# Patient Record
Sex: MALE | Race: BLACK OR AFRICAN AMERICAN | Employment: FULL TIME | URBAN - METROPOLITAN AREA
[De-identification: names, ages, dates, MRNs, and addresses within clinical notes are randomized per-mention and may not be internally consistent; named-entity substitution may affect disease eponyms.]

---

## 2018-11-25 ENCOUNTER — APPOINTMENT (OUTPATIENT)
Dept: ULTRASOUND IMAGING | Age: 53
End: 2018-11-25
Payer: SELF-PAY

## 2018-11-25 ENCOUNTER — HOSPITAL ENCOUNTER (EMERGENCY)
Age: 53
Discharge: HOME | End: 2018-11-25
Attending: EMERGENCY MEDICINE
Payer: SELF-PAY

## 2018-11-25 VITALS
HEART RATE: 72 BPM | SYSTOLIC BLOOD PRESSURE: 199 MMHG | RESPIRATION RATE: 16 BRPM | BODY MASS INDEX: 44.1 KG/M2 | HEIGHT: 71 IN | WEIGHT: 315 LBS | TEMPERATURE: 98.2 F | DIASTOLIC BLOOD PRESSURE: 126 MMHG | OXYGEN SATURATION: 93 %

## 2018-11-25 DIAGNOSIS — N45.3 EPIDIDYMOORCHITIS: Primary | ICD-10-CM

## 2018-11-25 DIAGNOSIS — I10 UNCONTROLLED HYPERTENSION: ICD-10-CM

## 2018-11-25 LAB
APPEARANCE UR: CLEAR
BACTERIA URNS QL MICRO: ABNORMAL /HPF
BILIRUB UR QL CFM: NEGATIVE
COLOR UR: ABNORMAL
EPITH CASTS URNS QL MICRO: ABNORMAL /LPF
GLUCOSE UR STRIP.AUTO-MCNC: NEGATIVE MG/DL
HGB UR QL STRIP: NEGATIVE
KETONES UR QL STRIP.AUTO: NEGATIVE MG/DL
LEUKOCYTE ESTERASE UR QL STRIP.AUTO: NEGATIVE
NITRITE UR QL STRIP.AUTO: NEGATIVE
OTHER,OTHU: ABNORMAL
PH UR STRIP: 5.5 [PH] (ref 5–8)
PROT UR STRIP-MCNC: 30 MG/DL
RBC #/AREA URNS HPF: ABNORMAL /HPF (ref 0–5)
SP GR UR REFRACTOMETRY: 1.03 (ref 1–1.03)
UA: UC IF INDICATED,UAUC: ABNORMAL
UROBILINOGEN UR QL STRIP.AUTO: 1 EU/DL (ref 0.2–1)
WBC URNS QL MICRO: ABNORMAL /HPF (ref 0–4)

## 2018-11-25 PROCEDURE — 76870 US EXAM SCROTUM: CPT

## 2018-11-25 PROCEDURE — 99283 EMERGENCY DEPT VISIT LOW MDM: CPT

## 2018-11-25 PROCEDURE — 96372 THER/PROPH/DIAG INJ SC/IM: CPT

## 2018-11-25 PROCEDURE — 74011250637 HC RX REV CODE- 250/637: Performed by: PHYSICIAN ASSISTANT

## 2018-11-25 PROCEDURE — 87086 URINE CULTURE/COLONY COUNT: CPT

## 2018-11-25 PROCEDURE — 81001 URINALYSIS AUTO W/SCOPE: CPT

## 2018-11-25 PROCEDURE — 74011250636 HC RX REV CODE- 250/636: Performed by: PHYSICIAN ASSISTANT

## 2018-11-25 RX ORDER — HYDROCHLOROTHIAZIDE 25 MG/1
25 TABLET ORAL
Status: COMPLETED | OUTPATIENT
Start: 2018-11-25 | End: 2018-11-25

## 2018-11-25 RX ORDER — CIPROFLOXACIN 500 MG/1
500 TABLET ORAL 2 TIMES DAILY
Qty: 56 TAB | Refills: 0 | Status: SHIPPED | OUTPATIENT
Start: 2018-11-25 | End: 2018-12-23

## 2018-11-25 RX ORDER — CIPROFLOXACIN 500 MG/1
500 TABLET ORAL 2 TIMES DAILY
Qty: 56 TAB | Refills: 0 | Status: SHIPPED | OUTPATIENT
Start: 2018-11-25 | End: 2018-11-25

## 2018-11-25 RX ORDER — OXYCODONE AND ACETAMINOPHEN 5; 325 MG/1; MG/1
1 TABLET ORAL
Status: COMPLETED | OUTPATIENT
Start: 2018-11-25 | End: 2018-11-25

## 2018-11-25 RX ORDER — ATENOLOL 25 MG/1
25 TABLET ORAL
Qty: 30 TAB | Refills: 0 | Status: SHIPPED | OUTPATIENT
Start: 2018-11-25 | End: 2018-12-25

## 2018-11-25 RX ORDER — OXYCODONE AND ACETAMINOPHEN 5; 325 MG/1; MG/1
1 TABLET ORAL
Qty: 12 TAB | Refills: 0 | Status: SHIPPED | OUTPATIENT
Start: 2018-11-25 | End: 2019-12-03

## 2018-11-25 RX ORDER — KETOROLAC TROMETHAMINE 30 MG/ML
60 INJECTION, SOLUTION INTRAMUSCULAR; INTRAVENOUS
Status: COMPLETED | OUTPATIENT
Start: 2018-11-25 | End: 2018-11-25

## 2018-11-25 RX ORDER — ONDANSETRON 4 MG/1
4 TABLET, ORALLY DISINTEGRATING ORAL
Status: COMPLETED | OUTPATIENT
Start: 2018-11-25 | End: 2018-11-25

## 2018-11-25 RX ORDER — METOPROLOL SUCCINATE AND HYDROCHLOROTHIAZIDE 25; 12.5 MG/1; MG/1
25 TABLET ORAL
Qty: 30 TAB | Refills: 0 | Status: SHIPPED | OUTPATIENT
Start: 2018-11-25 | End: 2018-11-25 | Stop reason: CLARIF

## 2018-11-25 RX ORDER — NAPROXEN 500 MG/1
500 TABLET ORAL
Qty: 20 TAB | Refills: 0 | Status: SHIPPED | OUTPATIENT
Start: 2018-11-25 | End: 2019-12-03

## 2018-11-25 RX ORDER — LISINOPRIL AND HYDROCHLOROTHIAZIDE 12.5; 2 MG/1; MG/1
2 TABLET ORAL DAILY
Qty: 60 TAB | Refills: 0 | Status: SHIPPED | OUTPATIENT
Start: 2018-11-25 | End: 2018-11-25

## 2018-11-25 RX ORDER — ATENOLOL 25 MG/1
25 TABLET ORAL
Qty: 30 TAB | Refills: 0 | Status: SHIPPED | OUTPATIENT
Start: 2018-11-25 | End: 2018-11-25

## 2018-11-25 RX ORDER — LISINOPRIL AND HYDROCHLOROTHIAZIDE 12.5; 2 MG/1; MG/1
2 TABLET ORAL DAILY
Qty: 60 TAB | Refills: 0 | Status: SHIPPED | OUTPATIENT
Start: 2018-11-25 | End: 2018-12-25

## 2018-11-25 RX ORDER — LISINOPRIL 20 MG/1
20 TABLET ORAL
Status: COMPLETED | OUTPATIENT
Start: 2018-11-25 | End: 2018-11-25

## 2018-11-25 RX ORDER — NAPROXEN 500 MG/1
500 TABLET ORAL
Qty: 20 TAB | Refills: 0 | Status: SHIPPED | OUTPATIENT
Start: 2018-11-25 | End: 2018-11-25

## 2018-11-25 RX ADMIN — ONDANSETRON 4 MG: 4 TABLET, ORALLY DISINTEGRATING ORAL at 12:30

## 2018-11-25 RX ADMIN — OXYCODONE AND ACETAMINOPHEN 1 TABLET: 5; 325 TABLET ORAL at 12:31

## 2018-11-25 RX ADMIN — LISINOPRIL 20 MG: 20 TABLET ORAL at 13:33

## 2018-11-25 RX ADMIN — HYDROCHLOROTHIAZIDE 25 MG: 25 TABLET ORAL at 13:33

## 2018-11-25 RX ADMIN — KETOROLAC TROMETHAMINE 60 MG: 30 INJECTION, SOLUTION INTRAMUSCULAR at 12:30

## 2018-11-25 NOTE — ED NOTES
I have assumed care of the patient. The patient has been placed in a position of comfort with the call bell within reach. The patient presents to the ED with complaints of right sided testicular pain and swelling for the past several days. Denies any injury.

## 2018-11-25 NOTE — DISCHARGE INSTRUCTIONS
Learning About High Blood Pressure  What is high blood pressure? Blood pressure is a measure of how hard the blood pushes against the walls of your arteries. It's normal for blood pressure to go up and down throughout the day, but if it stays up, you have high blood pressure. Another name for high blood pressure is hypertension. Two numbers tell you your blood pressure. The first number is the systolic pressure. It shows how hard the blood pushes when your heart is pumping. The second number is the diastolic pressure. It shows how hard the blood pushes between heartbeats, when your heart is relaxed and filling with blood. A blood pressure of less than 120/80 (say \"120 over 80\") is ideal for an adult. High blood pressure is 130/80 or higher. You have high blood pressure if your top number is 130 or higher or your bottom number is 80 or higher, or both. What happens when you have high blood pressure? · Blood flows through your arteries with too much force. Over time, this damages the walls of your arteries. But you can't feel it. High blood pressure usually doesn't cause symptoms. · Fat and calcium start to build up in your arteries. This buildup is called plaque. Plaque makes your arteries narrower and stiffer. Blood can't flow through them as easily. · This lack of good blood flow starts to damage some of the organs in your body. This can lead to problems such as coronary artery disease and heart attack, heart failure, stroke, kidney failure, and eye damage. How can you prevent high blood pressure? · Stay at a healthy weight. · Try to limit how much sodium you eat to less than 2,300 milligrams (mg) a day. If you limit your sodium to 1,500 mg a day, you can lower your blood pressure even more. ? Buy foods that are labeled \"unsalted,\" \"sodium-free,\" or \"low-sodium. \" Foods labeled \"reduced-sodium\" and \"light sodium\" may still have too much sodium. ?  Flavor your food with garlic, lemon juice, onion, vinegar, herbs, and spices instead of salt. Do not use soy sauce, steak sauce, onion salt, garlic salt, mustard, or ketchup on your food. ? Use less salt (or none) when recipes call for it. You can often use half the salt a recipe calls for without losing flavor. · Be physically active. Get at least 30 minutes of exercise on most days of the week. Walking is a good choice. You also may want to do other activities, such as running, swimming, cycling, or playing tennis or team sports. · Limit alcohol to 2 drinks a day for men and 1 drink a day for women. · Eat plenty of fruits, vegetables, and low-fat dairy products. Eat less saturated and total fats. How is high blood pressure treated? · Your doctor will suggest making lifestyle changes. For example, your doctor may ask you to eat healthy foods, quit smoking, lose extra weight, and be more active. · If lifestyle changes don't help enough, your doctor may recommend that you take medicine. · When blood pressure is very high, medicines are needed to lower it. Follow-up care is a key part of your treatment and safety. Be sure to make and go to all appointments, and call your doctor if you are having problems. It's also a good idea to know your test results and keep a list of the medicines you take. Where can you learn more? Go to http://kiera-josé luis.info/. Enter P501 in the search box to learn more about \"Learning About High Blood Pressure. \"  Current as of: December 6, 2017  Content Version: 11.8  © 5824-9607 [employer]. Care instructions adapted under license by [employer] (which disclaims liability or warranty for this information). If you have questions about a medical condition or this instruction, always ask your healthcare professional. [address] any warranty or liability for your use of this information.          Epididymitis and Orchitis: Care Instructions  Your Care Instructions    Epididymitis is pain and swelling of the tube that is attached to each testicle. This tube is called the epididymis. Orchitis is pain and swelling of the testicle. Infection with bacteria often causes these conditions. Sexually transmitted infections (STIs) also can cause both conditions. This is often the case in men younger than 28. Other causes in boys and older men are infections from surgery or having a catheter that drains urine. The mumps virus also can cause orchitis. Anti-inflammatory or pain medicines can help with the pain. Antibiotics are used if the problem is caused by bacteria. They are not used if a virus is the cause. Your testicle may stay swollen for many days or even a few weeks. The doctor has checked you carefully, but problems can develop later. If you notice any problems or new symptoms, get medical treatment right away. Follow-up care is a key part of your treatment and safety. Be sure to make and go to all appointments, and call your doctor if you are having problems. It's also a good idea to know your test results and keep a list of the medicines you take. How can you care for yourself at home? · If your doctor prescribed antibiotics, take them as directed. Do not stop taking them just because you feel better. You need to take the full course of antibiotics. · Ask your doctor if you can take an over-the-counter pain medicine, such as acetaminophen (Tylenol), ibuprofen (Advil, Motrin), or naproxen (Aleve). Be safe with medicines. Read and follow all instructions on the label. · Limit your activity to what is comfortable. · Wear snug underwear or an athletic supporter. This can help reduce pain. · Apply either cold or heat to the swollen area. Use the one that works best for your pain. Sitting in a warm bath for 15 minutes twice a day will help reduce the swelling more quickly.   · If you have been told that an STI may have caused your condition, do not have sex until your doctor says it is safe. This will prevent spreading the infection. Tell your sex partner or partners that they need to be checked. They may need treatment. When should you call for help? Call your doctor now or seek immediate medical care if:    · Your pain gets worse.     · You have a new or higher fever.     · You have new or more swelling of your testicle.     · You have new belly pain, or your pain gets worse.    Watch closely for changes in your health, and be sure to contact your doctor if:    · You do not get better as expected. Where can you learn more? Go to http://kiera-josé luis.info/. Enter S360 in the search box to learn more about \"Epididymitis and Orchitis: Care Instructions. \"  Current as of: March 21, 2018  Content Version: 11.8  © 0896-8997 [employer]. Care instructions adapted under license by [employer] (which disclaims liability or warranty for this information). If you have questions about a medical condition or this instruction, always ask your healthcare professional. [address] any warranty or liability for your use of this information.

## 2018-11-25 NOTE — ED PROVIDER NOTES
EMERGENCY DEPARTMENT HISTORY AND PHYSICAL EXAM      Date: 11/25/2018  Patient Name: Lytle Fabry    History of Presenting Illness     Chief Complaint   Patient presents with    Testicle Pain     Ambulatory into the ED with c/o non-traumatic Rt testicle swelling and tenderness x 3-4 days. Denies discoloration/wounds. Denies abdominal pain, n/v/d and urinary symptoms. History Provided By: Patient    HPI: Lytle Fabry, 46 y.o. male with presents ambulatory to the ED with c/o R testicle pain, swelling x 3-4 days. Pt denied redness. No injury. No lesion/rash. No fever/chills. No vomiting/diarrhea. Pt denied h/o recurrent urinary tract infections/prostate infections. Pt denied seeing a Urologist in the past.  He reports he has a h/o HTN but admittedly has not been taking his medications as prescribed. Pt is o/w healthy without fever, chills, cough, congestion, ST, shortness of breath, chest pain, N/V/D. Chief Complaint: R testicular pain, swelling  Duration: 3 Days  Timing:  Acute  Location: R testicle  Quality: Aching  Severity: Moderate  Modifying Factors: pt with increased discomfort noted during sleep, hasn't slept well  Associated Symptoms: denies any other associated signs or symptoms    There are no other complaints, changes, or physical findings at this time. PCP: Ling Fernandes MD    Current Facility-Administered Medications   Medication Dose Route Frequency Provider Last Rate Last Dose    ketorolac (TORADOL) injection 60 mg  60 mg IntraMUSCular NOW Karina Ji        oxyCODONE-acetaminophen (PERCOCET) 5-325 mg per tablet 1 Tab  1 Tab Oral NOW Karina Ji        ondansetron (ZOFRAN ODT) tablet 4 mg  4 mg Oral NOW Karina Taylor         Current Outpatient Medications   Medication Sig Dispense Refill    atenolol (TENORMIN) 25 mg tablet Take 25 mg by mouth daily.  Olmesartan-Amlodipine-HCTZ (TRIBENZOR) 40-10-12.5 mg Tab Take  by mouth.       ibuprofen (MOTRIN) 600 mg tablet Take  by mouth every six (6) hours as needed.  hydrocodone-acetaminophen (VICODIN) 5-500 mg per tablet Take 1 Tab by mouth every four (4) hours as needed for Pain. 30 Tab 0       Past History     Past Medical History:  Past Medical History:   Diagnosis Date    Contusion, chest wall 2/12/2010    Hypertension     Muscle strain of chest wall 2/12/2010       Past Surgical History:  Past Surgical History:   Procedure Laterality Date    HX CHOLECYSTECTOMY         Family History:  Family History   Problem Relation Age of Onset    Diabetes Mother     Hypertension Father     Diabetes Father        Social History:  Social History     Tobacco Use    Smoking status: Never Smoker   Substance Use Topics    Alcohol use: Yes     Alcohol/week: 1.0 oz     Types: 2 Cans of beer per week     Comment: occasionally    Drug use: Not on file       Allergies:  No Known Allergies  Review of Systems   Review of Systems   Constitutional: Negative for chills and fever. HENT: Negative for congestion, rhinorrhea and sore throat. Respiratory: Negative for cough and shortness of breath. Cardiovascular: Negative for chest pain and palpitations. Gastrointestinal: Negative for abdominal pain, diarrhea, nausea and vomiting. Endocrine: Negative for polydipsia, polyphagia and polyuria. Genitourinary: Positive for scrotal swelling and testicular pain. Negative for dysuria, frequency, genital sores, hematuria and penile swelling. Musculoskeletal: Negative for back pain, neck pain and neck stiffness. Skin: Negative for rash and wound. Allergic/Immunologic: Negative for food allergies and immunocompromised state. Neurological: Negative for dizziness, weakness, numbness and headaches. Psychiatric/Behavioral: Negative for agitation and confusion. Physical Exam   Physical Exam   Constitutional: He is oriented to person, place, and time. He appears well-developed and well-nourished. No distress. Obese AA male, alert, in NAD   HENT:   Head: Normocephalic and atraumatic. Nose: Nose normal.   Mouth/Throat: Oropharynx is clear and moist. No oropharyngeal exudate. Eyes: Conjunctivae and EOM are normal. Right eye exhibits no discharge. Left eye exhibits no discharge. No scleral icterus. Neck: Normal range of motion. Neck supple. No JVD present. No tracheal deviation present. No thyromegaly present. Cardiovascular: Normal rate, regular rhythm and normal heart sounds. Pulmonary/Chest: Effort normal and breath sounds normal. No respiratory distress. He has no wheezes. Abdominal: Soft. He exhibits no distension. There is no tenderness. There is no rebound and no guarding. No CVAT   Musculoskeletal: Normal range of motion. He exhibits no edema. Lymphadenopathy:     He has no cervical adenopathy. Neurological: He is alert and oriented to person, place, and time. He exhibits normal muscle tone. Coordination normal.   Skin: Skin is warm and dry. No rash noted. He is not diaphoretic. No erythema. Psychiatric: He has a normal mood and affect. His behavior is normal. Judgment normal.   Nursing note and vitals reviewed.     Diagnostic Study Results     Labs -     Recent Results (from the past 12 hour(s))   URINALYSIS W/ REFLEX CULTURE    Collection Time: 11/25/18 11:40 AM   Result Value Ref Range    Color YELLOW/STRAW      Appearance CLEAR CLEAR      Specific gravity 1.028 1.003 - 1.030      pH (UA) 5.5 5.0 - 8.0      Protein 30 (A) NEG mg/dL    Glucose NEGATIVE  NEG mg/dL    Ketone NEGATIVE  NEG mg/dL    Blood NEGATIVE  NEG      Urobilinogen 1.0 0.2 - 1.0 EU/dL    Nitrites NEGATIVE  NEG      Leukocyte Esterase NEGATIVE  NEG      WBC PENDING /hpf    RBC PENDING /hpf    Epithelial cells PENDING /lpf    Bacteria PENDING /hpf    UA:UC IF INDICATED PENDING    BILIRUBIN, CONFIRM    Collection Time: 11/25/18 11:40 AM   Result Value Ref Range    Bilirubin UA, confirm NEGATIVE  NEG         Radiologic Studies - US SCROTUM/TESTICLES    (Results Pending)   US SCROTUM/TESTICLES (Final result)   Result time 11/25/18 12:20:11   Final result by Victor Hugo Oakley Results In (11/25/18 12:18:15)                Initial Result:   Impression:    IMPRESSION:    1. Right-sided epididymoorchitis.                      Narrative:    EXAM:  US SCROTUM/TESTICLES    INDICATION:   Rt testicle pain and swelling    COMPARISON: None. TECHNIQUE:  Real-time sonography of the scrotum was performed with a high frequency linear  transducer. Multiple static images were obtained. Color Doppler evaluation was  also performed. FINDINGS:  The testicles are normal in size and echotexture with color-flow bilaterally. There is hyperemia within the right testicle. There is a small area of  diminished echogenicity with diminished vascularity in the right testicle. The  right testis measures 4.4 x 3.4 x 3.3 cm and the left testis measures 4.7 x 2.9  x 2.7 cm. There is hyperemia in the right epididymis. .                Medical Decision Making   I am the first provider for this patient. I reviewed the vital signs, available nursing notes, past medical history, past surgical history, family history and social history. Vital Signs-Reviewed the patient's vital signs. Patient Vitals for the past 12 hrs:   Temp Pulse Resp BP SpO2   11/25/18 1113 98.2 °F (36.8 °C) 72 16 (!) 207/124 93 %     Records Reviewed: Nursing Notes, Old Medical Records, Previous Radiology Studies and Previous Laboratory Studies    Provider Notes (Medical Decision Making):   Epididymitis, hydrocele, testicular mass, orchitis, cellulitis/abscess     ED Course:   Initial assessment performed. The patients presenting problems have been discussed, and they are in agreement with the care plan formulated and outlined with them. I have encouraged them to ask questions as they arise throughout their visit. Case d/w Dr. Luisito Piedra who agreed to evaluate patient's scrotal pain/swelling.   He will see the patient at bedside. Reviewed ultrasound findings with patient/wife. Reviewed antibiotics. Pt reports he has been out of his blood pressure medications for \"awhile\". He reports he was covered under his wife's insurance but she is no longer employed, thus he is without insurance. He also reports he has had a h/o difficulty in obtaining a true/accurate blood pressure due to his size. \"they usually have to use a big leg cuff on my arm\". Provided a dose of his ace inhibitor and HCTZ. He reports he had a Bystolic left that he took this morning. Strongly encouraged follow up with the [facility] Blood pressure clinic. DISCHARGE NOTE:  The care plan has been outline with the patient and/or family, who verbally conveyed understanding and agreement. Available results have been reviewed. Patient and/or family understand the follow up plan as outlined and discharge instructions. Should their condition deterioration at any time after discharge the patient agrees to return, follow up sooner than outlined or seek medical assistance at the closest Emergency Room as soon as possible. Questions have been answered. Discharge instructions and educational information regarding the patient's diagnosis as well a list of reasons why the patient would want to seek immediate medical attention, should their condition change, were reviewed directly with the patient/family       PLAN:  1. Discharge Medication List as of 11/25/2018  1:21 PM      START taking these medications    Details   oxyCODONE-acetaminophen (PERCOCET) 5-325 mg per tablet Take 1 Tab by mouth every eight (8) hours as needed for Pain for up to 12 doses. Max Daily Amount: 3 Tabs., Print, Disp-12 Tab, R-0      ciprofloxacin HCl (CIPRO) 500 mg tablet Take 1 Tab by mouth two (2) times a day for 28 days. , Print, Disp-56 Tab, R-0      naproxen (NAPROSYN) 500 mg tablet Take 1 Tab by mouth every twelve (12) hours as needed for Pain for up to 20 doses. , Print, Disp-20 Tab, R-0      lisinopril-hydroCHLOROthiazide (PRINZIDE, ZESTORETIC) 20-12.5 mg per tablet Take 2 Tabs by mouth daily for 30 days. , Print, Disp-60 Tab, R-0      metoprolol cadet-hydrochlorothiaz 25-12.5 mg Tb24 Take 25 mg by mouth nightly for 30 days. , Print, Disp-30 Tab, R-0         CONTINUE these medications which have NOT CHANGED    Details   atenolol (TENORMIN) 25 mg tablet Take 25 mg by mouth daily. Historical Med, 25 mg      Olmesartan-Amlodipine-HCTZ (TRIBENZOR) 40-10-12.5 mg Tab Take  by mouth. Historical Med           2. Follow-up Information     Follow up With Specialties Details Why Contact Info    Pedro Lee MD Urology   [address]  [phone]      [address] [facility]    [address] [address]  [phone]    [facility] EMERGENCY DEPT Emergency Medicine  If symptoms worsen [address] [place] [address]  [phone]        Return to ED if worse     Diagnosis     Clinical Impression:   1. Epididymoorchitis    2.  Uncontrolled hypertension

## 2018-11-25 NOTE — LETTER
[address] 
[facility] EMERGENCY DEPT 
[address] [address] 
[phone] Work/School Note Date: 11/25/2018 To Whom It May concern: 
 
Nimo Miller was seen and treated today in the emergency room. He may return to work in 2 to 3 days, as symptoms improve. Sincerely, Lois Resendez [place]

## 2018-11-27 LAB
BACTERIA SPEC CULT: NORMAL
CC UR VC: NORMAL
SERVICE CMNT-IMP: NORMAL

## 2019-11-29 ENCOUNTER — HOSPITAL ENCOUNTER (INPATIENT)
Age: 54
LOS: 4 days | Discharge: HOME | DRG: 244 | End: 2019-12-03
Attending: EMERGENCY MEDICINE | Admitting: HOSPITALIST
Payer: MEDICAID

## 2019-11-29 ENCOUNTER — APPOINTMENT (OUTPATIENT)
Dept: CT IMAGING | Age: 54
DRG: 244 | End: 2019-11-29
Attending: EMERGENCY MEDICINE
Payer: MEDICAID

## 2019-11-29 DIAGNOSIS — K62.5 RECTAL BLEEDING: Primary | ICD-10-CM

## 2019-11-29 PROBLEM — K92.2 GI BLEED: Status: ACTIVE | Noted: 2019-11-29

## 2019-11-29 LAB
ANION GAP SERPL CALC-SCNC: 2 MMOL/L (ref 5–15)
BASOPHILS # BLD: 0 K/UL (ref 0–0.1)
BASOPHILS NFR BLD: 0 % (ref 0–1)
BUN SERPL-MCNC: 24 MG/DL (ref 6–20)
BUN/CREAT SERPL: 25 (ref 12–20)
CALCIUM SERPL-MCNC: 7.9 MG/DL (ref 8.5–10.1)
CHLORIDE SERPL-SCNC: 108 MMOL/L (ref 97–108)
CO2 SERPL-SCNC: 34 MMOL/L (ref 21–32)
CREAT SERPL-MCNC: 0.96 MG/DL (ref 0.7–1.3)
DIFFERENTIAL METHOD BLD: ABNORMAL
EOSINOPHIL # BLD: 0 K/UL (ref 0–0.4)
EOSINOPHIL NFR BLD: 1 % (ref 0–7)
ERYTHROCYTE [DISTWIDTH] IN BLOOD BY AUTOMATED COUNT: 13.2 % (ref 11.5–14.5)
GLUCOSE SERPL-MCNC: 111 MG/DL (ref 65–100)
HCT VFR BLD AUTO: 43.2 % (ref 36.6–50.3)
HGB BLD-MCNC: 13.1 G/DL (ref 12.1–17)
IMM GRANULOCYTES # BLD AUTO: 0 K/UL (ref 0–0.04)
IMM GRANULOCYTES NFR BLD AUTO: 0 % (ref 0–0.5)
LYMPHOCYTES # BLD: 0.9 K/UL (ref 0.8–3.5)
LYMPHOCYTES NFR BLD: 13 % (ref 12–49)
MCH RBC QN AUTO: 27.6 PG (ref 26–34)
MCHC RBC AUTO-ENTMCNC: 30.3 G/DL (ref 30–36.5)
MCV RBC AUTO: 90.9 FL (ref 80–99)
MONOCYTES # BLD: 0.6 K/UL (ref 0–1)
MONOCYTES NFR BLD: 9 % (ref 5–13)
NEUTS SEG # BLD: 5.3 K/UL (ref 1.8–8)
NEUTS SEG NFR BLD: 77 % (ref 32–75)
NRBC # BLD: 0 K/UL (ref 0–0.01)
NRBC BLD-RTO: 0 PER 100 WBC
PLATELET # BLD AUTO: 252 K/UL (ref 150–400)
PMV BLD AUTO: 9.7 FL (ref 8.9–12.9)
POTASSIUM SERPL-SCNC: 4.4 MMOL/L (ref 3.5–5.1)
RBC # BLD AUTO: 4.75 M/UL (ref 4.1–5.7)
SODIUM SERPL-SCNC: 144 MMOL/L (ref 136–145)
WBC # BLD AUTO: 6.8 K/UL (ref 4.1–11.1)

## 2019-11-29 PROCEDURE — 99284 EMERGENCY DEPT VISIT MOD MDM: CPT

## 2019-11-29 PROCEDURE — 74011250636 HC RX REV CODE- 250/636: Performed by: EMERGENCY MEDICINE

## 2019-11-29 PROCEDURE — 74011636320 HC RX REV CODE- 636/320: Performed by: EMERGENCY MEDICINE

## 2019-11-29 PROCEDURE — 96374 THER/PROPH/DIAG INJ IV PUSH: CPT

## 2019-11-29 PROCEDURE — 74011250636 HC RX REV CODE- 250/636: Performed by: HOSPITALIST

## 2019-11-29 PROCEDURE — C9113 INJ PANTOPRAZOLE SODIUM, VIA: HCPCS | Performed by: EMERGENCY MEDICINE

## 2019-11-29 PROCEDURE — C9113 INJ PANTOPRAZOLE SODIUM, VIA: HCPCS | Performed by: HOSPITALIST

## 2019-11-29 PROCEDURE — 74011000250 HC RX REV CODE- 250: Performed by: HOSPITALIST

## 2019-11-29 PROCEDURE — 36415 COLL VENOUS BLD VENIPUNCTURE: CPT

## 2019-11-29 PROCEDURE — 80048 BASIC METABOLIC PNL TOTAL CA: CPT

## 2019-11-29 PROCEDURE — 74178 CT ABD&PLV WO CNTR FLWD CNTR: CPT

## 2019-11-29 PROCEDURE — 74011000258 HC RX REV CODE- 258: Performed by: EMERGENCY MEDICINE

## 2019-11-29 PROCEDURE — 85025 COMPLETE CBC W/AUTO DIFF WBC: CPT

## 2019-11-29 PROCEDURE — 99285 EMERGENCY DEPT VISIT HI MDM: CPT

## 2019-11-29 PROCEDURE — 65660000000 HC RM CCU STEPDOWN

## 2019-11-29 RX ORDER — PANTOPRAZOLE SODIUM 40 MG/10ML
40 INJECTION, POWDER, LYOPHILIZED, FOR SOLUTION INTRAVENOUS
Status: COMPLETED | OUTPATIENT
Start: 2019-11-29 | End: 2019-11-29

## 2019-11-29 RX ORDER — SODIUM CHLORIDE 0.9 % (FLUSH) 0.9 %
5-40 SYRINGE (ML) INJECTION AS NEEDED
Status: DISCONTINUED | OUTPATIENT
Start: 2019-11-29 | End: 2019-12-03 | Stop reason: HOSPADM

## 2019-11-29 RX ORDER — ACETAMINOPHEN 325 MG/1
650 TABLET ORAL
Status: DISCONTINUED | OUTPATIENT
Start: 2019-11-29 | End: 2019-12-03 | Stop reason: HOSPADM

## 2019-11-29 RX ORDER — SODIUM CHLORIDE 9 MG/ML
150 INJECTION, SOLUTION INTRAVENOUS ONCE
Status: COMPLETED | OUTPATIENT
Start: 2019-11-29 | End: 2019-11-29

## 2019-11-29 RX ORDER — SODIUM CHLORIDE 9 MG/ML
100 INJECTION, SOLUTION INTRAVENOUS CONTINUOUS
Status: DISCONTINUED | OUTPATIENT
Start: 2019-11-29 | End: 2019-12-03 | Stop reason: HOSPADM

## 2019-11-29 RX ORDER — SODIUM CHLORIDE 450 MG/100ML
125 INJECTION, SOLUTION INTRAVENOUS CONTINUOUS
Status: DISCONTINUED | OUTPATIENT
Start: 2019-11-29 | End: 2019-11-30

## 2019-11-29 RX ORDER — ATORVASTATIN CALCIUM 10 MG/1
10 TABLET, FILM COATED ORAL
Status: DISCONTINUED | OUTPATIENT
Start: 2019-11-30 | End: 2019-12-03 | Stop reason: HOSPADM

## 2019-11-29 RX ORDER — TAMSULOSIN HYDROCHLORIDE 0.4 MG/1
0.4 CAPSULE ORAL DAILY
Status: CANCELLED | OUTPATIENT
Start: 2019-11-30

## 2019-11-29 RX ORDER — SODIUM CHLORIDE 0.9 % (FLUSH) 0.9 %
5-40 SYRINGE (ML) INJECTION EVERY 8 HOURS
Status: DISCONTINUED | OUTPATIENT
Start: 2019-11-29 | End: 2019-12-03 | Stop reason: HOSPADM

## 2019-11-29 RX ORDER — CARVEDILOL 3.12 MG/1
3.12 TABLET ORAL 2 TIMES DAILY WITH MEALS
Status: CANCELLED | OUTPATIENT
Start: 2019-11-30

## 2019-11-29 RX ORDER — SODIUM CHLORIDE 0.9 % (FLUSH) 0.9 %
10 SYRINGE (ML) INJECTION
Status: COMPLETED | OUTPATIENT
Start: 2019-11-29 | End: 2019-11-29

## 2019-11-29 RX ADMIN — IOPAMIDOL 100 ML: 755 INJECTION, SOLUTION INTRAVENOUS at 11:25

## 2019-11-29 RX ADMIN — Medication 10 ML: at 11:25

## 2019-11-29 RX ADMIN — SODIUM CHLORIDE 1000 ML: 900 INJECTION, SOLUTION INTRAVENOUS at 13:02

## 2019-11-29 RX ADMIN — SODIUM CHLORIDE 125 ML/HR: 450 INJECTION, SOLUTION INTRAVENOUS at 16:27

## 2019-11-29 RX ADMIN — SODIUM CHLORIDE 150 ML/HR: 900 INJECTION, SOLUTION INTRAVENOUS at 10:07

## 2019-11-29 RX ADMIN — Medication 10 ML: at 21:23

## 2019-11-29 RX ADMIN — PANTOPRAZOLE SODIUM 40 MG: 40 INJECTION, POWDER, FOR SOLUTION INTRAVENOUS at 13:02

## 2019-11-29 RX ADMIN — PANTOPRAZOLE SODIUM 40 MG: 40 INJECTION, POWDER, FOR SOLUTION INTRAVENOUS at 21:18

## 2019-11-29 NOTE — ED PROVIDER NOTES
EMERGENCY DEPARTMENT HISTORY AND PHYSICAL EXAM          Date: 11/29/2019  Patient Name: Taina Donohue    History of Presenting Illness     Chief Complaint   Patient presents with    Rectal Bleeding     Patient presents to the ED from [address] for rectal bleeding / GI consult. Patient has had four episode of bright red rectal bleeding since 0430 this morning. History Provided By: Patient    HPI: Taina Donohue is a 53 y.o. male, pmhx HTN, who presents via EMS as transfer from [facility] for GIB    Pt notes he didn't feel well yesterday and his stomach was a little upset but didn't have any AP. He notes last night he then had an episode of diarrhea which was all dark stool. This AM he had 3 episodes of BRBPR which were associated with stool so he went to the ER. En route to the hospital he had another episode and passed out and then had a 5th episode at the [facility]. At the facility his VS were normal although HR was elevated and his hgb was 14. He notes he hasnt had another episode since but feels a little pressure now. PCP: Makeda Betancourt MD   No prior GI docs    Allergies: nkda  Social Hx: -tobacco, -EtOH, -Illicit Drugs    There are no other complaints, changes, or physical findings at this time. Current Facility-Administered Medications   Medication Dose Route Frequency Provider Last Rate Last Dose    iopamidol (ISOVUE-370) 76 % injection             acetaminophen (TYLENOL) tablet 650 mg  650 mg Oral Q6H PRN Kobe Paiz MD        0.45% sodium chloride infusion  125 mL/hr IntraVENous CONTINUOUS TermeerKobe MD         Current Outpatient Medications   Medication Sig Dispense Refill    oxyCODONE-acetaminophen (PERCOCET) 5-325 mg per tablet Take 1 Tab by mouth every eight (8) hours as needed for Pain for up to 12 doses. Max Daily Amount: 3 Tabs. 12 Tab 0    naproxen (NAPROSYN) 500 mg tablet Take 1 Tab by mouth every twelve (12) hours as needed for Pain for up to 20 doses.  21 Tab 0    atenolol (TENORMIN) 25 mg tablet Take 25 mg by mouth daily.  Olmesartan-Amlodipine-HCTZ (TRIBENZOR) 40-10-12.5 mg Tab Take  by mouth. Past History     Past Medical History:  Past Medical History:   Diagnosis Date    Contusion, chest wall 2/12/2010    Hypertension     Muscle strain of chest wall 2/12/2010       Past Surgical History:  Past Surgical History:   Procedure Laterality Date    HX CHOLECYSTECTOMY         Family History:  Family History   Problem Relation Age of Onset    Diabetes Mother     Hypertension Father     Diabetes Father        Social History:  Social History     Tobacco Use    Smoking status: Never Smoker   Substance Use Topics    Alcohol use: Yes     Alcohol/week: 1.7 standard drinks     Types: 2 Cans of beer per week     Comment: occasionally    Drug use: Not on file       Allergies:  No Known Allergies      Review of Systems   Review of Systems   Constitutional: Positive for fatigue (Yesterday). Negative for activity change, appetite change, chills, fever and unexpected weight change. HENT: Negative for congestion. Eyes: Negative for pain and visual disturbance. Respiratory: Negative for cough and shortness of breath. Cardiovascular: Negative for chest pain. Gastrointestinal: Positive for anal bleeding, blood in stool and diarrhea. Negative for abdominal pain, constipation, nausea and vomiting. Genitourinary: Negative for dysuria. Musculoskeletal: Negative for back pain. Skin: Negative for rash. Neurological: Negative for headaches. Physical Exam   Physical Exam  Vitals signs and nursing note reviewed. Constitutional:       Appearance: He is well-developed. He is not diaphoretic. Comments: Middle-aged male currently in mild distress   HENT:      Head: Normocephalic and atraumatic. Eyes:      General:         Right eye: No discharge. Left eye: No discharge.       Conjunctiva/sclera: Conjunctivae normal.      Pupils: Pupils are equal, round, and reactive to light. Neck:      Musculoskeletal: Normal range of motion and neck supple. Cardiovascular:      Rate and Rhythm: Regular rhythm. Tachycardia present. Heart sounds: Normal heart sounds. No murmur. Comments: Heart rate currently 100  Pulmonary:      Effort: Pulmonary effort is normal. No respiratory distress. Breath sounds: Normal breath sounds. No wheezing or rales. Abdominal:      General: Bowel sounds are normal. There is no distension. Palpations: Abdomen is soft. Tenderness: There is no tenderness. Genitourinary:     Comments: Patient is sitting in dark maroon blood in stool all over his buttock and rectum without any obvious hemorrhoids. Due to level of obesity unable to reach into his rectum to obtain a sample. Musculoskeletal: Normal range of motion. Skin:     General: Skin is warm and dry. Findings: No rash. Neurological:      Mental Status: He is alert and oriented to person, place, and time. Cranial Nerves: No cranial nerve deficit. Motor: No abnormal muscle tone. Diagnostic Study Results     Labs -     Recent Results (from the past 12 hour(s))   CBC WITH AUTOMATED DIFF    Collection Time: 11/29/19 10:12 AM   Result Value Ref Range    WBC 6.8 4.1 - 11.1 K/uL    RBC 4.75 4.10 - 5.70 M/uL    HGB 13.1 12.1 - 17.0 g/dL    HCT 43.2 36.6 - 50.3 %    MCV 90.9 80.0 - 99.0 FL    MCH 27.6 26.0 - 34.0 PG    MCHC 30.3 30.0 - 36.5 g/dL    RDW 13.2 11.5 - 14.5 %    PLATELET 298 560 - 228 K/uL    MPV 9.7 8.9 - 12.9 FL    NRBC 0.0 0  WBC    ABSOLUTE NRBC 0.00 0.00 - 0.01 K/uL    NEUTROPHILS 77 (H) 32 - 75 %    LYMPHOCYTES 13 12 - 49 %    MONOCYTES 9 5 - 13 %    EOSINOPHILS 1 0 - 7 %    BASOPHILS 0 0 - 1 %    IMMATURE GRANULOCYTES 0 0.0 - 0.5 %    ABS. NEUTROPHILS 5.3 1.8 - 8.0 K/UL    ABS. LYMPHOCYTES 0.9 0.8 - 3.5 K/UL    ABS. MONOCYTES 0.6 0.0 - 1.0 K/UL    ABS. EOSINOPHILS 0.0 0.0 - 0.4 K/UL    ABS.  BASOPHILS 0.0 0.0 - 0.1 K/UL    ABS. IMM. GRANS. 0.0 0.00 - 0.04 K/UL    DF AUTOMATED     METABOLIC PANEL, BASIC    Collection Time: 11/29/19 10:12 AM   Result Value Ref Range    Sodium 144 136 - 145 mmol/L    Potassium 4.4 3.5 - 5.1 mmol/L    Chloride 108 97 - 108 mmol/L    CO2 34 (H) 21 - 32 mmol/L    Anion gap 2 (L) 5 - 15 mmol/L    Glucose 111 (H) 65 - 100 mg/dL    BUN 24 (H) 6 - 20 MG/DL    Creatinine 0.96 0.70 - 1.30 MG/DL    BUN/Creatinine ratio 25 (H) 12 - 20      GFR est AA >60 >60 ml/min/1.73m2    GFR est non-AA >60 >60 ml/min/1.73m2    Calcium 7.9 (L) 8.5 - 10.1 MG/DL       Radiologic Studies -   CT ABD PELV W WO CONT   Final Result   IMPRESSION: No acute findings seen. Liver and spleen appear unremarkable. CT Results  (Last 48 hours)               11/29/19 1124  CT ABD PELV W WO CONT Final result    Impression:  IMPRESSION: No acute findings seen. Liver and spleen appear unremarkable. Narrative:  EXAM: CT ABD PELV W WO CONT       INDICATION: rectal bleeding, painless       COMPARISON: None. CONTRAST: 100 mL of Isovue-370. TECHNIQUE:     Multislice helical CT was performed from the diaphragm to the iliac crest prior   to intravenous contrast administration and from the diaphragm to the symphysis   pubis during uneventful rapid bolus intravenous contrast administration. Oral   contrast was not administered. Contiguous 5 mm axial images were reconstructed   and lung and soft tissue windows were generated. Coronal and sagittal   reformations were generated. CT dose reduction was achieved through use of a   standardized protocol tailored for this examination and automatic exposure   control for dose modulation. FINDINGS:   Small left lung base nodule measures 4 mm. Prior cholecystectomy. Vessels appear patent, no aneurysm. There is no extravasation of contrast. There   is no free air or free fluid. There is no bowel wall thickening or obstruction. Sigmoid diverticulosis of. Adrenals and pancreas appear unremarkable. Kidneys are unobstructed. CXR Results  (Last 48 hours)    None            Medical Decision Making   I am the first provider for this patient. I reviewed the vital signs, available nursing notes, past medical history, past surgical history, family history and social history. Vital Signs-Reviewed the patient's vital signs. Patient Vitals for the past 12 hrs:   Temp Pulse Resp BP SpO2   11/29/19 0927 97.8 °F (36.6 °C) (!) 103 24 (!) 148/109 97 %       Pulse Oximetry Analysis - 97% on RA    Cardiac Monitor:   Rate: 105bpm  Rhythm: Sinus Tachycardia      Records Reviewed: Nursing Notes, Old Medical Records and Outside records    Provider Notes (Medical Decision Making):   MDM: Patient with continual rectal bleeding with some general feelings of not feeling well and some diarrhea yesterday. Will repeat CBC to make sure his hemoglobin has not dropped and check a CT abdomen pelvis. ED Course:   Initial assessment performed. The patients presenting problems have been discussed, and they are in agreement with the care plan formulated and outlined with them. I have encouraged them to ask questions as they arise throughout their visit. PROGRESS NOTE:  1341  Pt remains stable although his heart rate is borderline elevated. Repeat hemoglobin slightly decreased from outside facility although he does remain hemodynamically stable. Has not had any further episodes of bleeding since he is arrived. Await CT scan. CONSULT NOTE:   11AM  Monie Toribio MD spoke with Eric,   Specialty: Vel Martell  Discussed pt's hx, disposition, and available diagnostic and imaging results. Reviewed care plans. Consultant agrees with plans as outlined. He will see the patient in the hospital for further evaluation. 1:15 PM  CT without any evidence of acute extravasation. Will discuss with internal medicine for admission.     CONSULT NOTE:   2:03 PM  Stoney Harvey MD Izabel spoke with Dr. Shalonda Huerta,   Specialty: Internal medicine service  Discussed pt's hx, disposition, and available diagnostic and imaging results. Reviewed care plans. Consultant agrees with plans as outlined. He will evaluate the patient for admission. Critical Care Time:   0      Diagnosis     Clinical Impression:   1. Rectal bleeding        PLAN:  1. Admission to the internal medicine service for further management and care  2. GI consultation      Please note, this dictation was completed with [employer], the computer voice recognition software. Quite often unanticipated grammatical, syntax, homophones, and other interpretive errors are inadvertently transcribed by the computer software. Please disregard these errors. Please excuse any errors that have escaped final proof reading.

## 2019-11-29 NOTE — PROGRESS NOTES
Bedside and Verbal shift change report given to [address] [place] [address] (oncoming nurse) by Hector Mei (offgoing nurse). Report included the following information SBAR, Kardex, Procedure Summary, Intake/Output, MAR, Accordion and Recent Results.

## 2019-11-30 ENCOUNTER — APPOINTMENT (OUTPATIENT)
Dept: CT IMAGING | Age: 54
DRG: 244 | End: 2019-11-30
Attending: INTERNAL MEDICINE
Payer: MEDICAID

## 2019-11-30 LAB
ANION GAP SERPL CALC-SCNC: 5 MMOL/L (ref 5–15)
BUN SERPL-MCNC: 24 MG/DL (ref 6–20)
BUN/CREAT SERPL: 27 (ref 12–20)
CALCIUM SERPL-MCNC: 7.7 MG/DL (ref 8.5–10.1)
CHLORIDE SERPL-SCNC: 109 MMOL/L (ref 97–108)
CO2 SERPL-SCNC: 28 MMOL/L (ref 21–32)
CREAT SERPL-MCNC: 0.89 MG/DL (ref 0.7–1.3)
ERYTHROCYTE [DISTWIDTH] IN BLOOD BY AUTOMATED COUNT: 13.4 % (ref 11.5–14.5)
ERYTHROCYTE [DISTWIDTH] IN BLOOD BY AUTOMATED COUNT: 13.5 % (ref 11.5–14.5)
GLUCOSE SERPL-MCNC: 183 MG/DL (ref 65–100)
HCT VFR BLD AUTO: 32.3 % (ref 36.6–50.3)
HCT VFR BLD AUTO: 34.4 % (ref 36.6–50.3)
HCT VFR BLD AUTO: 34.4 % (ref 36.6–50.3)
HCT VFR BLD AUTO: 37.4 % (ref 36.6–50.3)
HCT VFR BLD AUTO: 37.4 % (ref 36.6–50.3)
HGB BLD-MCNC: 10 G/DL (ref 12.1–17)
HGB BLD-MCNC: 10.5 G/DL (ref 12.1–17)
HGB BLD-MCNC: 10.6 G/DL (ref 12.1–17)
HGB BLD-MCNC: 11.3 G/DL (ref 12.1–17)
HGB BLD-MCNC: 11.4 G/DL (ref 12.1–17)
MCH RBC QN AUTO: 27.8 PG (ref 26–34)
MCH RBC QN AUTO: 27.9 PG (ref 26–34)
MCHC RBC AUTO-ENTMCNC: 30.2 G/DL (ref 30–36.5)
MCHC RBC AUTO-ENTMCNC: 30.8 G/DL (ref 30–36.5)
MCV RBC AUTO: 90.5 FL (ref 80–99)
MCV RBC AUTO: 92.1 FL (ref 80–99)
NRBC # BLD: 0 K/UL (ref 0–0.01)
NRBC # BLD: 0 K/UL (ref 0–0.01)
NRBC BLD-RTO: 0 PER 100 WBC
NRBC BLD-RTO: 0 PER 100 WBC
PLATELET # BLD AUTO: 222 K/UL (ref 150–400)
PLATELET # BLD AUTO: 237 K/UL (ref 150–400)
PMV BLD AUTO: 9.3 FL (ref 8.9–12.9)
PMV BLD AUTO: 9.5 FL (ref 8.9–12.9)
POTASSIUM SERPL-SCNC: 3.3 MMOL/L (ref 3.5–5.1)
RBC # BLD AUTO: 3.8 M/UL (ref 4.1–5.7)
RBC # BLD AUTO: 4.06 M/UL (ref 4.1–5.7)
SODIUM SERPL-SCNC: 142 MMOL/L (ref 136–145)
TROPONIN I SERPL-MCNC: <0.05 NG/ML
TROPONIN I SERPL-MCNC: <0.05 NG/ML
WBC # BLD AUTO: 6.8 K/UL (ref 4.1–11.1)
WBC # BLD AUTO: 7.7 K/UL (ref 4.1–11.1)

## 2019-11-30 PROCEDURE — 74011250636 HC RX REV CODE- 250/636: Performed by: NURSE PRACTITIONER

## 2019-11-30 PROCEDURE — 85018 HEMOGLOBIN: CPT

## 2019-11-30 PROCEDURE — 70450 CT HEAD/BRAIN W/O DYE: CPT

## 2019-11-30 PROCEDURE — 65660000000 HC RM CCU STEPDOWN

## 2019-11-30 PROCEDURE — 77010033678 HC OXYGEN DAILY

## 2019-11-30 PROCEDURE — 86900 BLOOD TYPING SEROLOGIC ABO: CPT

## 2019-11-30 PROCEDURE — 80048 BASIC METABOLIC PNL TOTAL CA: CPT

## 2019-11-30 PROCEDURE — 74011250636 HC RX REV CODE- 250/636: Performed by: HOSPITALIST

## 2019-11-30 PROCEDURE — C9113 INJ PANTOPRAZOLE SODIUM, VIA: HCPCS | Performed by: HOSPITALIST

## 2019-11-30 PROCEDURE — 36415 COLL VENOUS BLD VENIPUNCTURE: CPT

## 2019-11-30 PROCEDURE — 94760 N-INVAS EAR/PLS OXIMETRY 1: CPT

## 2019-11-30 PROCEDURE — 85027 COMPLETE CBC AUTOMATED: CPT

## 2019-11-30 PROCEDURE — 86923 COMPATIBILITY TEST ELECTRIC: CPT

## 2019-11-30 PROCEDURE — 74011000250 HC RX REV CODE- 250: Performed by: HOSPITALIST

## 2019-11-30 PROCEDURE — 84484 ASSAY OF TROPONIN QUANT: CPT

## 2019-11-30 PROCEDURE — 74011250637 HC RX REV CODE- 250/637: Performed by: HOSPITALIST

## 2019-11-30 PROCEDURE — 36600 WITHDRAWAL OF ARTERIAL BLOOD: CPT

## 2019-11-30 RX ORDER — POTASSIUM CHLORIDE 7.45 MG/ML
10 INJECTION INTRAVENOUS
Status: DISCONTINUED | OUTPATIENT
Start: 2019-11-30 | End: 2019-11-30 | Stop reason: DRUGHIGH

## 2019-11-30 RX ORDER — SODIUM CHLORIDE 9 MG/ML
250 INJECTION, SOLUTION INTRAVENOUS AS NEEDED
Status: DISCONTINUED | OUTPATIENT
Start: 2019-11-30 | End: 2019-12-03 | Stop reason: HOSPADM

## 2019-11-30 RX ORDER — POLYETHYLENE GLYCOL 3350, SODIUM SULFATE ANHYDROUS, SODIUM BICARBONATE, SODIUM CHLORIDE, POTASSIUM CHLORIDE 236; 22.74; 6.74; 5.86; 2.97 G/4L; G/4L; G/4L; G/4L; G/4L
4000 POWDER, FOR SOLUTION ORAL ONCE
Status: COMPLETED | OUTPATIENT
Start: 2019-12-01 | End: 2019-12-01

## 2019-11-30 RX ORDER — POTASSIUM CHLORIDE 7.45 MG/ML
10 INJECTION INTRAVENOUS
Status: COMPLETED | OUTPATIENT
Start: 2019-11-30 | End: 2019-11-30

## 2019-11-30 RX ADMIN — ATORVASTATIN CALCIUM 10 MG: 10 TABLET, FILM COATED ORAL at 23:00

## 2019-11-30 RX ADMIN — POTASSIUM CHLORIDE 10 MEQ: 7.46 INJECTION, SOLUTION INTRAVENOUS at 13:23

## 2019-11-30 RX ADMIN — POTASSIUM CHLORIDE 10 MEQ: 7.46 INJECTION, SOLUTION INTRAVENOUS at 15:39

## 2019-11-30 RX ADMIN — SODIUM CHLORIDE 100 ML/HR: 900 INJECTION, SOLUTION INTRAVENOUS at 09:57

## 2019-11-30 RX ADMIN — POTASSIUM CHLORIDE 10 MEQ: 7.46 INJECTION, SOLUTION INTRAVENOUS at 11:11

## 2019-11-30 RX ADMIN — PANTOPRAZOLE SODIUM 40 MG: 40 INJECTION, POWDER, FOR SOLUTION INTRAVENOUS at 09:44

## 2019-11-30 RX ADMIN — PANTOPRAZOLE SODIUM 40 MG: 40 INJECTION, POWDER, FOR SOLUTION INTRAVENOUS at 23:00

## 2019-11-30 RX ADMIN — Medication 10 ML: at 13:25

## 2019-11-30 RX ADMIN — POTASSIUM CHLORIDE 10 MEQ: 7.46 INJECTION, SOLUTION INTRAVENOUS at 12:17

## 2019-11-30 RX ADMIN — POTASSIUM CHLORIDE 10 MEQ: 7.46 INJECTION, SOLUTION INTRAVENOUS at 14:27

## 2019-11-30 RX ADMIN — Medication 10 ML: at 23:00

## 2019-11-30 RX ADMIN — POTASSIUM CHLORIDE 10 MEQ: 7.46 INJECTION, SOLUTION INTRAVENOUS at 09:44

## 2019-11-30 NOTE — PROGRESS NOTES
RRT was called because patient has syncopal episode while he was at bathroom , patient has rectal bleeding , vital sign was checked , HG stat was order

## 2019-11-30 NOTE — PROGRESS NOTES
Irina Flanagan NP to obtain order for PRN arterial stick for lab work. PICC team at bedside to draw H&H/CBC. Order obtained for PRN arterial stick for labs. Order placed for CBC, BMP and magnesium with 0400 labs.  D/C Q8 CBC and continue Q6 H&H.

## 2019-11-30 NOTE — PROGRESS NOTES
H and H and troponin sent to lab.  Saleem Munoz NP states to run normal saline order and not half normal.

## 2019-11-30 NOTE — H&P
[address]  HISTORY AND PHYSICAL    Name:  Montez Contreras  MR#:  [id number]  :  1965  ACCOUNT #:  [de-identified]  ADMIT DATE:  2019    PRIMARY CARE PHYSICIAN:  Unknown    PRESENTING COMPLAINT:  Transferred from [facility] due to GI bleed. HISTORY OF PRESENT ILLNESS:  The patient is a 69-year-old Afro-American gentleman with previous history significant for hypertension and hyperlipidemia, initially felt worse yesterday. The patient states he is unable to describe, but he did not feel well yesterday. This morning, he had four episodes of copious amount of dark red blood from the rectum. According to the patient, when he was coming to the emergency room en route, he became weak and dizzy, and passed out for a few seconds. He denies having any abdominal pain. He does not take any NSAIDs, but takes p.r.n. Tylenol. On further questioning, the patient tells me that he has  two episodes of heartburn last week. He denies having any chest pain or shortness of breath. In the emergency room at [facility], his initial blood work revealed a hemoglobin of 14 with a hematocrit of 42.8. He was given one dose of Protonix and 1 L of fluid was given, then he was sent here. In this emergency room, he received 1 L of IV fluids as well. At this time, he has no symptoms. Denies having any nausea, vomiting, or chest pain. PAST MEDICAL HISTORY:  Significant for,  1. Hyperlipidemia. 2.  Hypertension. PAST SURGICAL HISTORY:  Significant for cholecystectomy and shoulder surgery. MEDICATIONS:  His home medications are not clear. At this time, however, he thinks he takes Bystolic, hydrochlorothiazide, and Lipitor. FAMILY HISTORY:  Significant for diabetes and colon cancer. He says his mother had colon cancer. REVIEW OF SYSTEMS:  Negative except as mentioned in the history of present illness, all systems were reviewed.   No other positive finding was noted.    PHYSICAL EXAMINATION:  GENERAL:  The patient is a 50-year-old gentleman who is not in any acute distress. VITAL SIGNS:  Temperature is 97.8, blood pressure 148/109, pulse 103, respiratory rate of 24, saturation is 97%. HEENT:  Pupils are equal, reactive to light and accommodation. NECK:  Supple. There is no lymphadenopathy or JVD. CHEST:  Clear. No wheezing or crackles. CARDIOVASCULAR:  S1 and S2, minimally tachycardic. No murmurs. ABDOMEN:  No tenderness. No guarding. No rigidity. Bowel sounds are active. EXTREMITIES:  No pedal edema. CNS:  The patient is alert and oriented, has normal strength and normal reflexes. Plantars downgoing. Cranial nerves are normal.. PSYCH:  Unremarkable. LABORATORY DATA:  Lab work done in this ER revealed CBC with a white count of 6.8, hemoglobin is 13.1, hematocrit is 42.2, MCV is 90.9, platelets count is 199,564. His chemistries revealed sodium 144, potassium 4.4, chloride 108, bicarbonate 34. Anion gap is 2, glucose is 111, BUN is 24, creatinine is 0.96, calcium is 7.9. A CT of abdomen and pelvis with and without contrast was done, which shows no acute findings. EKG has not been done. ASSESSMENT AND PLAN:  The patient is a 50-year-old gentleman who has previous history significant for hyperlipidemia and hypertension. He was admitted due to    1. Bright red blood per rectum with associated brief episode of syncope, will be admitted to telemetry. 2.  The patient is hemodynamically stable. We will continue on intravenous fluids. 3.  Consults Gastroenterology. 4.  Starting on Protonix intravenously. CT of the abdomen does not show any acute findings. However, he tells me that he has heartburn twice last week. 5.  History of hyperlipidemia and hypertension. We will hold his blood pressure medication at this time. His blood pressure is stable. 6.  Check serial CBC.   7.  Deep venous thrombosis prophylaxis with sequential compression device. 8.  ER physician has discussed the case with Dr. Cheryl Monzon, who will see him. It is of note that the patient had  colonoscopy done few years ago, which according to him was unremarkable. 9. Syncope : Probably related to blood loss. Monitor orthostatic BP. Obtain EKG.         Una Bosworth, MD [email or web address]  D:  11/29/2019 14:30  T:  11/29/2019 19:32  JOB #:  [id number]

## 2019-11-30 NOTE — PROGRESS NOTES
No IV access at start of shift. Nurse and RR nurse attempted to obtain IV access and were unsuccessful. PICC team notified and stated they would come by. Paged Carry DEJON Garcia regarding loss of IV access and IV potassium/fluids ordered.

## 2019-11-30 NOTE — PROGRESS NOTES
[address]  [address] [facility] [address] [place]  [address] [facility], [address]  [phone]                GI PROGRESS NOTE      NAME:   Pari Gore   :    1965   MRN:    [id number]     Subjective:     Pt doing well. No further bleeding. Objective:     VITALS:   Last 24hrs VS reviewed since prior hospitalist progress note. Most recent are:  Visit Vitals  /73 (BP 1 Location: Right arm, BP Patient Position: At rest)   Pulse 91   Temp 98.3 °F (36.8 °C)   Resp 18   Ht 5' 11\" (1.803 m)   Wt (!) 172.4 kg (380 lb)   SpO2 94%   BMI 53.00 kg/m²       Intake/Output Summary (Last 24 hours) at 2019 1709  Last data filed at 2019 1650  Gross per 24 hour   Intake --   Output 700 ml   Net -700 ml        PHYSICAL EXAM:  General   well developed, obese AAM  EENT  Normocephalic,   Neck   Supple without nodes or mass. No thyromegaly or bruit  Respiratory   Clear To Auscultation bilaterally - no wheezes, rales, rhonchi, or crackles  Cardiology  Regular Rate and Rythmn  - no murmurs, rubs or gallops  Abdominal  Soft, non-tender, obese  Lab Data   Recent Results (from the past 12 hour(s))   CBC W/O DIFF    Collection Time: 19  9:26 AM   Result Value Ref Range    WBC 7.7 4.1 - 11.1 K/uL    RBC 3.80 (L) 4.10 - 5.70 M/uL    HGB 10.6 (L) 12.1 - 17.0 g/dL    HCT 34.4 (L) 36.6 - 50.3 %    MCV 90.5 80.0 - 99.0 FL    MCH 27.9 26.0 - 34.0 PG    MCHC 30.8 30.0 - 36.5 g/dL    RDW 13.5 11.5 - 14.5 %    PLATELET 201 152 - 071 K/uL    MPV 9.3 8.9 - 12.9 FL    NRBC 0.0 0  WBC    ABSOLUTE NRBC 0.00 0.00 - 0.01 K/uL   TROPONIN I    Collection Time: 19  9:26 AM   Result Value Ref Range    Troponin-I, Qt. <0.05 <0.05 ng/mL   HGB & HCT    Collection Time: 19  9:26 AM   Result Value Ref Range    HGB 10.5 (L) 12.1 - 17.0 g/dL    HCT 34.4 (L) 36.6 - 50.3 %       Medications: Reviewed    Assessment/Plan     Pari Gore is a 53 y.o.  male who was admitted with rectal bleeding.  He is currently stable. Clear liquid diet tomorrow with bowel prep tomorrow evening.  Plan for colonoscopy Monday    Attending Physician: Shira Dejesus MD   Date/Time:  11/30/2019

## 2019-11-30 NOTE — PROGRESS NOTES
3953 Nurse went to answer bathroom call bell and found patient on the toilet complaining of dizziness. Patient got up against nurse's orders and collapsed which resulted in him hitting his posteria neck and head on the sink. Called rapid response and obtained a set of vital signs. Rapid response and other colleagues came in to help. Patient was put on 2 ls of oxygen, obtained stat labs and placed in chair. He was then placed in bed. Doctor also ordered head CT due to fall post defecation.    Further assessment of the contents in the toilet revealed what look like dark red and slightly bright red blood mixed with feces

## 2019-11-30 NOTE — CONSULTS
[address]    Name:  Aimee Moise  MR#:  [id number]  :  1965  ACCOUNT #:  [de-identified]  DATE OF SERVICE:  2019      CHIEF COMPLAINT:  Bright red rectal bleeding. HISTORY OF PRESENT ILLNESS:  A 69-year-old gentleman with history of hypertension, hyperlipidemia, who a day before his admission, complained of some vague nausea, but otherwise was feeling okay. On the day of admission, early in the a.m., he arose and had a large bloody stool followed by three more subsequent stools. On the 40-minute ride from [address] [place] [address] [facility], he had some transient syncope reported by his wife. Apparently, the patient was originally seen at [address] [place] [address] and then referred back down to [facility]. The patient denied any associated pain. No additional nausea today. No vomiting. No anal discomfort. The patient has had no prior episodes. There has been no change in his bowel habits in the past.  He has been loosing weight, but he has been dieting. The patient denies any significant NSAID usage. He drinks rarely and is a nonsmoker. Denies any significant shortness of breath or chest pain. The patient is status post colonoscopy about 5 years ago and was told there was no abnormalities. PAST MEDICAL HISTORY:  Positive for hyperlipidemia and hypertension. PAST SURGICAL HISTORY:  Positive for cholecystectomy and shoulder surgery. MEDICATIONS:  May include Tenormin as well as olmesartan, but it is not clear. Medications listed for him include oxycodone and naproxen, which he emphatically denies taking. FAMILY HISTORY:  Positive for diabetes and hypertension. SOCIAL HISTORY:  Nonsmoker. Occasional drinker, 2 beers per week. REVIEW OF SYSTEMS:  As noted above as well as some knee pain. PHYSICAL EXAMINATION:  VITAL SIGNS:  Temperature is 98.9, pulse is 88, blood pressure is 156/94, respirations are 18.   GENERAL: Near morbidly obese gentleman, oriented x3, in no acute distress. SKIN:  No petechiae or ecchymosis. HEAD, EYES, EARS, NOSE, AND THROAT:  Sclerae are anicteric. Conjunctivae are pink. Moist mucous membranes. NECK:  Supple. There is no adenopathy in the neck or groin. CHEST:  Clear to auscultation and percussion. HEART:  Reveals a regular rate and rhythm. ABDOMEN:  Obese, soft, nontender, no hepatosplenomegaly. EXTREMITIES:  Obese, but there is no pedal edema. No cyanosis, no clubbing. LABORATORY DATA:  He had only one hemoglobin and that is 13.1 at [address] [place] [address], I think it was 14. A hepatic panel done at [address] [place] [address] was normal.  Recent urinalysis was unremarkable. BUN was 24 with creatinine of 0.96, slight elevation with BUN and creatinine ratio of 25. CTA demonstrated no source of bleeding. There was a prior cholecystectomy and there was sigmoid diverticulosis. IMPRESSION AND PLAN:  Rectal bleeding, most likely diverticular, rule out neoplasia, less likely arteriovenous malformation, suspect this is lower gastrointestinal in nature. We will start clear liquids, the weekend call nurse will be seeing him and determining and will be scheduling his colonoscopy in the next 1 to 2 days.         MD RAMON Du/[email or web address]  D:  11/29/2019 20:12  T:  11/29/2019 23:44  JOB #:  [id number]  CC:  Jelly Decker MD

## 2019-11-30 NOTE — PROGRESS NOTES
Bedside shift change report given to Aisha (oncoming nurse) by Marquis Gaytan (offgoing nurse). Report included the following information SBAR, Kardex, Procedure Summary, Intake/Output, MAR, Accordion and Recent Results.

## 2019-11-30 NOTE — PROGRESS NOTES
Rounded on patient this morning d/t recent RRT for syncope/vagal episode(?)  Reviewed chart, vital signs, patient in NAD, spoke with Deja Casey. Repeat H&H 10.5/34. 4. No RRT interventions indicated.     Moris GOLDMAN, RN, Tawana Youssef, KEVAN, CPEN  Rapid Response RN

## 2019-12-01 LAB
ALBUMIN SERPL-MCNC: 2.7 G/DL (ref 3.5–5)
ALBUMIN/GLOB SERPL: 1 {RATIO} (ref 1.1–2.2)
ALP SERPL-CCNC: 38 U/L (ref 45–117)
ALT SERPL-CCNC: 18 U/L (ref 12–78)
ANION GAP SERPL CALC-SCNC: 3 MMOL/L (ref 5–15)
AST SERPL-CCNC: 11 U/L (ref 15–37)
BASOPHILS # BLD: 0 K/UL (ref 0–0.1)
BASOPHILS NFR BLD: 0 % (ref 0–1)
BILIRUB SERPL-MCNC: 0.3 MG/DL (ref 0.2–1)
BUN SERPL-MCNC: 21 MG/DL (ref 6–20)
BUN/CREAT SERPL: 27 (ref 12–20)
CALCIUM SERPL-MCNC: 7.1 MG/DL (ref 8.5–10.1)
CHLORIDE SERPL-SCNC: 110 MMOL/L (ref 97–108)
CO2 SERPL-SCNC: 29 MMOL/L (ref 21–32)
COMMENT, HOLDF: NORMAL
CREAT SERPL-MCNC: 0.78 MG/DL (ref 0.7–1.3)
DIFFERENTIAL METHOD BLD: ABNORMAL
EOSINOPHIL # BLD: 0.2 K/UL (ref 0–0.4)
EOSINOPHIL NFR BLD: 4 % (ref 0–7)
ERYTHROCYTE [DISTWIDTH] IN BLOOD BY AUTOMATED COUNT: 13.6 % (ref 11.5–14.5)
GLOBULIN SER CALC-MCNC: 2.6 G/DL (ref 2–4)
GLUCOSE SERPL-MCNC: 127 MG/DL (ref 65–100)
HCT VFR BLD AUTO: 30.9 % (ref 36.6–50.3)
HCT VFR BLD AUTO: 31.3 % (ref 36.6–50.3)
HGB BLD-MCNC: 9.4 G/DL (ref 12.1–17)
HGB BLD-MCNC: 9.6 G/DL (ref 12.1–17)
IMM GRANULOCYTES # BLD AUTO: 0 K/UL (ref 0–0.04)
IMM GRANULOCYTES NFR BLD AUTO: 0 % (ref 0–0.5)
LYMPHOCYTES # BLD: 1.1 K/UL (ref 0.8–3.5)
LYMPHOCYTES NFR BLD: 20 % (ref 12–49)
MAGNESIUM SERPL-MCNC: 2.2 MG/DL (ref 1.6–2.4)
MCH RBC QN AUTO: 27.7 PG (ref 26–34)
MCHC RBC AUTO-ENTMCNC: 30.4 G/DL (ref 30–36.5)
MCV RBC AUTO: 91.2 FL (ref 80–99)
MONOCYTES # BLD: 0.6 K/UL (ref 0–1)
MONOCYTES NFR BLD: 10 % (ref 5–13)
NEUTS SEG # BLD: 3.5 K/UL (ref 1.8–8)
NEUTS SEG NFR BLD: 66 % (ref 32–75)
NRBC # BLD: 0 K/UL (ref 0–0.01)
NRBC BLD-RTO: 0 PER 100 WBC
PLATELET # BLD AUTO: 199 K/UL (ref 150–400)
PMV BLD AUTO: 9.3 FL (ref 8.9–12.9)
POTASSIUM SERPL-SCNC: 3.7 MMOL/L (ref 3.5–5.1)
PROT SERPL-MCNC: 5.3 G/DL (ref 6.4–8.2)
RBC # BLD AUTO: 3.39 M/UL (ref 4.1–5.7)
SAMPLES BEING HELD,HOLD: NORMAL
SODIUM SERPL-SCNC: 142 MMOL/L (ref 136–145)
WBC # BLD AUTO: 5.4 K/UL (ref 4.1–11.1)

## 2019-12-01 PROCEDURE — 36415 COLL VENOUS BLD VENIPUNCTURE: CPT

## 2019-12-01 PROCEDURE — 65270000029 HC RM PRIVATE

## 2019-12-01 PROCEDURE — 74011000250 HC RX REV CODE- 250: Performed by: NURSE PRACTITIONER

## 2019-12-01 PROCEDURE — 83735 ASSAY OF MAGNESIUM: CPT

## 2019-12-01 PROCEDURE — C9113 INJ PANTOPRAZOLE SODIUM, VIA: HCPCS | Performed by: HOSPITALIST

## 2019-12-01 PROCEDURE — 74011000250 HC RX REV CODE- 250: Performed by: HOSPITALIST

## 2019-12-01 PROCEDURE — 74011250637 HC RX REV CODE- 250/637: Performed by: NURSE PRACTITIONER

## 2019-12-01 PROCEDURE — 80053 COMPREHEN METABOLIC PANEL: CPT

## 2019-12-01 PROCEDURE — 94640 AIRWAY INHALATION TREATMENT: CPT

## 2019-12-01 PROCEDURE — 85025 COMPLETE CBC W/AUTO DIFF WBC: CPT

## 2019-12-01 PROCEDURE — 74011250637 HC RX REV CODE- 250/637: Performed by: HOSPITALIST

## 2019-12-01 PROCEDURE — 74011000250 HC RX REV CODE- 250: Performed by: INTERNAL MEDICINE

## 2019-12-01 PROCEDURE — 74011250636 HC RX REV CODE- 250/636: Performed by: HOSPITALIST

## 2019-12-01 PROCEDURE — 94760 N-INVAS EAR/PLS OXIMETRY 1: CPT

## 2019-12-01 RX ORDER — ATENOLOL 25 MG/1
25 TABLET ORAL DAILY
Status: DISCONTINUED | OUTPATIENT
Start: 2019-12-01 | End: 2019-12-03 | Stop reason: HOSPADM

## 2019-12-01 RX ORDER — ALBUTEROL SULFATE 0.83 MG/ML
2.5 SOLUTION RESPIRATORY (INHALATION)
Status: DISCONTINUED | OUTPATIENT
Start: 2019-12-01 | End: 2019-12-03 | Stop reason: HOSPADM

## 2019-12-01 RX ADMIN — SODIUM CHLORIDE 100 ML/HR: 900 INJECTION, SOLUTION INTRAVENOUS at 21:55

## 2019-12-01 RX ADMIN — ATENOLOL 25 MG: 25 TABLET ORAL at 08:39

## 2019-12-01 RX ADMIN — PANTOPRAZOLE SODIUM 40 MG: 40 INJECTION, POWDER, FOR SOLUTION INTRAVENOUS at 08:12

## 2019-12-01 RX ADMIN — Medication 10 ML: at 13:14

## 2019-12-01 RX ADMIN — Medication 10 ML: at 08:12

## 2019-12-01 RX ADMIN — SODIUM CHLORIDE 100 ML/HR: 900 INJECTION, SOLUTION INTRAVENOUS at 16:54

## 2019-12-01 RX ADMIN — Medication 10 ML: at 21:51

## 2019-12-01 RX ADMIN — ATORVASTATIN CALCIUM 10 MG: 10 TABLET, FILM COATED ORAL at 21:51

## 2019-12-01 RX ADMIN — ALBUTEROL SULFATE 2.5 MG: 2.5 SOLUTION RESPIRATORY (INHALATION) at 09:13

## 2019-12-01 RX ADMIN — POLYETHYLENE GLYCOL 3350, SODIUM SULFATE ANHYDROUS, SODIUM BICARBONATE, SODIUM CHLORIDE, POTASSIUM CHLORIDE 4000 ML: 236; 22.74; 6.74; 5.86; 2.97 POWDER, FOR SOLUTION ORAL at 16:54

## 2019-12-01 RX ADMIN — PANTOPRAZOLE SODIUM 40 MG: 40 INJECTION, POWDER, FOR SOLUTION INTRAVENOUS at 21:52

## 2019-12-01 NOTE — PROGRESS NOTES
Hospitalist Progress Note    NAME: Victorino Thompson   :  1965   MRN:  [id number]       Assessment / Plan:  Rectal bleeding  Acute blood loss anemia  CT abdomen/pelvis 19:  No acute findings seen. Liver and spleen appear unremarkable. - H/H trending down slowly, morning labs pending 2/2 difficult access. - Continue q6h H/H.  - Continue clear liquid diet. - Continue IVF. - Continue pantoprazole 40 mg IV q12h. - Plan of colonoscopy 19. HTN  Dyslipidemia   - BP trending up. - Resume atenolol 25 mg po daily. - Continue atorvastatin 10 mg po daily. Hypokalemia  - Morning labs pending. Syncope  CT head 19:  No acute intracranial abnormality.  - No further syncopal episodes after initial episode 19 a.m.      40 or above Morbid obesity / Body mass index is 53 kg/m². Code status: Full  Prophylaxis: SCD's and H2B/PPI  Recommended Disposition: Home w/Family     Subjective:     Chief Complaint / Reason for Physician Visit  No abdominal pain. Plan of care reviewed with RN. Review of Systems:  Symptom Y/N Comments  Symptom Y/N Comments   Fever/Chills N   Chest Pain N    Poor Appetite N   Edema Y    Cough N   Abdominal Pain N    Sputum N   Joint Pain N    SOB/COTE N   Pruritis/Rash N    Nausea/vomit N   Tolerating PT/OT     Diarrhea N   Tolerating Diet Y    Constipation N   Other       Could NOT obtain due to:      Objective:     VITALS:   Last 24hrs VS reviewed since prior progress note.  Most recent are:  Patient Vitals for the past 24 hrs:   Temp Pulse Resp BP SpO2   19 0810 98.1 °F (36.7 °C) 94 18 143/81 94 %   19 0446 98.2 °F (36.8 °C) 97 18 147/80 94 %   19 0007 98.6 °F (37 °C) 96 18 144/79 96 %   19 2036 99.7 °F (37.6 °C) (!) 102 18 133/77 95 %   19 1539 98.3 °F (36.8 °C) 91 18 127/73 94 %   19 1153 98.3 °F (36.8 °C) 91 18 133/83 100 %   19 0959 98 °F (36.7 °C) 92 18 142/78 98 %       Intake/Output Summary (Last 24 hours) at 12/1/2019 0819  Last data filed at 12/1/2019 0754  Gross per 24 hour   Intake --   Output 1600 ml   Net -1600 ml        PHYSICAL EXAM:  General:  A/A/O X 3. NAD. HEENT:  Normocephalic. Sclera anicteric. EOMI. Mucous membranes moist.    Chest:  Resps even/unlabored with symmetrical CWE. Air entry full. Lungs CTA. No use of accessory muscles. CV:  RRR. UEs edematous. LEs with 1 mm pretibial edema cayden. GI:  Abdomen soft/NT/ND. ABT X 4.   BM over noc with \"few small clots\" but no tila bleeding. Neurologic:  Nonfocal.  CN II-XII grossly intact. Speech normal.     Psych:  Cooperative. No anxiety or agitation. Skin:  Intact. No rashes or jaundice. Reviewed most current lab test results and cultures  YES  Reviewed most current radiology test results   YES  Review and summation of old records today    NO  Reviewed patient's current orders and MAR    YES  PMH/SH reviewed - no change compared to H&P  ________________________________________________________________________  Care Plan discussed with:    Comments   Patient [address] [place] [address]     Consultant                        Multidiciplinary team rounds were held today with , nursing, pharmacist and clinical coordinator. Patient's plan of care was discussed; medications were reviewed and discharge planning was addressed. __________________________________________________________________  Evie Funes NP     Procedures: see electronic medical records for all procedures/Xrays and details which were not copied into this note but were reviewed prior to creation of Plan. LABS:  I reviewed today's most current labs and imaging studies.   Pertinent labs include:  Recent Labs     11/30/19  2351 11/30/19  1736 11/30/19  0926 11/30/19  0420 11/29/19  1012   WBC  --   --  7.7 6.8 6.8   HGB 9.6* 10.0* 10.5*  10.6* 11.3*  11.4* 13.1   HCT 31.3* 32.3* 34.4*  34.4* 37.4  37.4 43.2   PLT  --   --  222 237 252     Recent Labs     11/30/19  0420 11/29/19  1012    144   K 3.3* 4.4   * 108   CO2 28 34*   * 111*   BUN 24* 24*   CREA 0.89 0.96   CA 7.7* 7.9*       Signed: Sharmila Boyer, NP      ;e

## 2019-12-01 NOTE — PROGRESS NOTES
[address]  [address] [facility], [address]  [phone]                GI PROGRESS NOTE      NAME:   Tonya Lim   :    1965   MRN:    [id number]     Subjective:     Pt doing well. No further bleeding. Objective:     VITALS:   Last 24hrs VS reviewed since prior hospitalist progress note. Most recent are:  Visit Vitals  /77 (BP 1 Location: Left arm, BP Patient Position: At rest)   Pulse 80   Temp 97.9 °F (36.6 °C)   Resp 18   Ht 5' 11\" (1.803 m)   Wt (!) 172.4 kg (380 lb)   SpO2 100%   BMI 53.00 kg/m²       Intake/Output Summary (Last 24 hours) at 2019 1412  Last data filed at 2019 0931  Gross per 24 hour   Intake --   Output 1550 ml   Net -1550 ml        PHYSICAL EXAM:  General   well developed, obese AAM  EENT  Normocephalic,   Neck   Supple without nodes or mass. No thyromegaly or bruit  Respiratory   Clear To Auscultation bilaterally - no wheezes, rales, rhonchi, or crackles  Cardiology  Regular Rate and Rythmn  - no murmurs, rubs or gallops  Abdominal  Soft, non-tender, obese  Lab Data   Recent Results (from the past 12 hour(s))   CBC WITH AUTOMATED DIFF    Collection Time: 19  9:16 AM   Result Value Ref Range    WBC 5.4 4.1 - 11.1 K/uL    RBC 3.39 (L) 4.10 - 5.70 M/uL    HGB 9.4 (L) 12.1 - 17.0 g/dL    HCT 30.9 (L) 36.6 - 50.3 %    MCV 91.2 80.0 - 99.0 FL    MCH 27.7 26.0 - 34.0 PG    MCHC 30.4 30.0 - 36.5 g/dL    RDW 13.6 11.5 - 14.5 %    PLATELET 958 539 - 646 K/uL    MPV 9.3 8.9 - 12.9 FL    NRBC 0.0 0  WBC    ABSOLUTE NRBC 0.00 0.00 - 0.01 K/uL    NEUTROPHILS 66 32 - 75 %    LYMPHOCYTES 20 12 - 49 %    MONOCYTES 10 5 - 13 %    EOSINOPHILS 4 0 - 7 %    BASOPHILS 0 0 - 1 %    IMMATURE GRANULOCYTES 0 0.0 - 0.5 %    ABS. NEUTROPHILS 3.5 1.8 - 8.0 K/UL    ABS. LYMPHOCYTES 1.1 0.8 - 3.5 K/UL    ABS. MONOCYTES 0.6 0.0 - 1.0 K/UL    ABS. EOSINOPHILS 0.2 0.0 - 0.4 K/UL    ABS. BASOPHILS 0.0 0.0 - 0.1 K/UL    ABS. IMM.  GRANS. 0.0 0.00 - 0.04 K/UL    DF AUTOMATED     MAGNESIUM    Collection Time: 12/01/19  9:16 AM   Result Value Ref Range    Magnesium 2.2 1.6 - 2.4 mg/dL   METABOLIC PANEL, COMPREHENSIVE    Collection Time: 12/01/19  9:16 AM   Result Value Ref Range    Sodium 142 136 - 145 mmol/L    Potassium 3.7 3.5 - 5.1 mmol/L    Chloride 110 (H) 97 - 108 mmol/L    CO2 29 21 - 32 mmol/L    Anion gap 3 (L) 5 - 15 mmol/L    Glucose 127 (H) 65 - 100 mg/dL    BUN 21 (H) 6 - 20 MG/DL    Creatinine 0.78 0.70 - 1.30 MG/DL    BUN/Creatinine ratio 27 (H) 12 - 20      GFR est AA >60 >60 ml/min/1.73m2    GFR est non-AA >60 >60 ml/min/1.73m2    Calcium 7.1 (L) 8.5 - 10.1 MG/DL    Bilirubin, total 0.3 0.2 - 1.0 MG/DL    ALT (SGPT) 18 12 - 78 U/L    AST (SGOT) 11 (L) 15 - 37 U/L    Alk. phosphatase 38 (L) 45 - 117 U/L    Protein, total 5.3 (L) 6.4 - 8.2 g/dL    Albumin 2.7 (L) 3.5 - 5.0 g/dL    Globulin 2.6 2.0 - 4.0 g/dL    A-G Ratio 1.0 (L) 1.1 - 2.2     SAMPLES BEING HELD    Collection Time: 12/01/19  9:16 AM   Result Value Ref Range    SAMPLES BEING HELD LAV     COMMENT        Add-on orders for these samples will be processed based on acceptable specimen integrity and analyte stability, which may vary by analyte. Medications: Reviewed    Assessment/Plan     Caesar Gale is a 53 y.o.  male who was admitted with rectal bleeding. He is currently stable. Clear liquid diet with bowel prep.  Plan for colonoscopy Monday    Attending Physician: Torrey Edwards MD   Date/Time:  12/1/2019

## 2019-12-01 NOTE — PROGRESS NOTES
RAPID RESPONSE TEAM- Follow Up    Rounded on patient due to recent rapid response. Discussed with primary RN, Christiane Erickson. No acute concerns, VSS, MEWS 2. Patient sitting in chair at bedside, c/o intermittent pain in back from fall. No other complaints. CT of head post fall: \"No acute intracranial abnormality. \"    Patient Vitals for the past 8 hrs:   Temp Pulse Resp BP SpO2   11/30/19 2036 99.7 °F (37.6 °C) (!) 102 18 133/77 95 %   11/30/19 1539 98.3 °F (36.8 °C) 91 18 127/73 94 %       HGB   Date Value Ref Range Status   11/30/2019 10.0 (L) 12.1 - 17.0 g/dL Final     HCT   Date Value Ref Range Status   11/30/2019 32.3 (L) 36.6 - 50.3 % Final     No RRT interventions indicated at this time. Please call with any questions or concerns.      Bisi Santos  Rapid Response LURDES Lee

## 2019-12-01 NOTE — PROGRESS NOTES
Reason for Admission: GI Bleed                     RRAT Score: 6                    Plan for utilizing home health: Unknown at this time. Currently there are no PT/OT consults. The patient will likely have no needs as he is independent in his ADLs and IADLs. Current Advanced Directive/Advance Care Plan: The patient is a full code and he does not have a MPOA/AD on-file                          Transition of Care Plan:     CM met with the patient at bedside to discuss dispo plan. The patient resides in a 2 level home with 4 steps to enter with his wife. The patient's mother, 3 sisters, and 1 brother all reside close to him. He has 1 daughter that lives in [place] and 1 stepdaughter in [place]. His family is extremely supportive. The patient is completely independent in his ADLs and IADLs. He uses no assistive device when ambulating. He is able to drive and his wife will assist with d/c transportation. The patient is employed full-time as a . He uses the [facility] for his [facility]. CM will continue to follow the patient for dispo needs. Care Management Interventions  PCP Verified by CM: Yes(The patient saw his PCP about 6 months ago )  Mode of Transport at Discharge:  Other (see comment)(The patient's wife will assist with d/c transportation )  Transition of Care Consult (CM Consult): Discharge Planning  MyChart Signup: No  Discharge Durable Medical Equipment: No  Physical Therapy Consult: No  Occupational Therapy Consult: No  Speech Therapy Consult: No  Current Support Network: Lives with Spouse  Confirm Follow Up Transport: Self  Plan discussed with Pt/Family/Caregiver: Yes  Freedom of Choice Offered: Yes   Resource Information Provided?: No  Discharge Location  Discharge Placement: Home    Shara Rose [facility]

## 2019-12-01 NOTE — PROGRESS NOTES
Bedside and Verbal shift change report given to [address] [place] [address] [address] (oncoming nurse) by Giselle Amor (offgoing nurse). Report included the following information SBAR, Kardex, Intake/Output, MAR, Accordion and Cardiac Rhythm nsr.

## 2019-12-01 NOTE — PROGRESS NOTES
6708 - Paged Yaron Tyler NP for PRN nebulizer treatment. Expiratory wheezing noted in right lungs. Paged RT for lab art stick and to come admin PRN neb treatment. 56 - Third RT able to obtain labs. Lab work sent.

## 2019-12-01 NOTE — PROGRESS NOTES
Bedside shift change report given to Sarath Hernández RN (oncoming nurse) by Mary Landeros RN (offgoing nurse). Report included the following information SBAR, Kardex, Procedure Summary, Intake/Output, MAR, Accordion, Recent Results, Med Rec Status and Cardiac Rhythm NSR, sinus tach.

## 2019-12-01 NOTE — PROGRESS NOTES
Hospitalist Progress Note    NAME: Taina Donohue   :  1965   MRN:  [id number]       Assessment / Plan:  Rectal bleeding  Acute blood loss anemia  - H/H trending down slowly. - Continue q6h H/H.  - Continue clear liquid diet. - Continue IVF. - Continue pantoprazole 40 mg IV q12h. HTN  Dyslipidemia   - Adequate BP control.  - Continue atorvastatin 10 mg po daily. Hypokalemia  - Replete. - Monitor      40 or above Morbid obesity / Body mass index is 53 kg/m². Code status: Full  Prophylaxis: SCD's and H2B/PPI  Recommended Disposition: Home w/Family     Subjective:     Chief Complaint / Reason for Physician Visit  No abdominal pain. No further bleeding. Plan of care reviewed with RN. Review of Systems:  Symptom Y/N Comments  Symptom Y/N Comments   Fever/Chills N   Chest Pain N    Poor Appetite N   Edema Y    Cough N   Abdominal Pain N    Sputum N   Joint Pain N    SOB/COTE N   Pruritis/Rash N    Nausea/vomit N   Tolerating PT/OT     Diarrhea N   Tolerating Diet Y    Constipation N   Other       Could NOT obtain due to:      Objective:     VITALS:   Last 24hrs VS reviewed since prior progress note. Most recent are:  Patient Vitals for the past 24 hrs:   Temp Pulse Resp BP SpO2   19 0446 98.2 °F (36.8 °C) 97 18 147/80 94 %   19 0007 98.6 °F (37 °C) 96 18 144/79 96 %   19 2036 99.7 °F (37.6 °C) (!) 102 18 133/77 95 %   19 1539 98.3 °F (36.8 °C) 91 18 127/73 94 %   19 1153 98.3 °F (36.8 °C) 91 18 133/83 100 %   19 0959 98 °F (36.7 °C) 92 18 142/78 98 %   19 0807 97.8 °F (36.6 °C) 94 18 131/81 100 %       Intake/Output Summary (Last 24 hours) at 2019 0758  Last data filed at 2019 0754  Gross per 24 hour   Intake --   Output 1600 ml   Net -1600 ml        PHYSICAL EXAM:  General:  A/A/O X 3. NAD. HEENT:  Normocephalic. Sclera anicteric. EOMI. Mucous membranes moist.    Chest:  Resps even/unlabored with symmetrical CWE.   Air entry full.  Lungs CTA. No use of accessory muscles. CV:  RRR. UEs edematous. LEs with 1 mm pretibial edema cayden. GI:  Abdomen soft/NT/ND. ABT X 4. No further bleeding. Neurologic:  Nonfocal.  CN II-XII grossly intact. Speech normal.     Psych:  Cooperative. No anxiety or agitation. Skin:  Intact. No rashes or jaundice. Reviewed most current lab test results and cultures  YES  Reviewed most current radiology test results   YES  Review and summation of old records today    NO  Reviewed patient's current orders and MAR    YES  PMH/SH reviewed - no change compared to H&P  ________________________________________________________________________  Care Plan discussed with:    Comments   Patient [address]     Consultant                        Multidiciplinary team rounds were held today with , nursing, pharmacist and clinical coordinator. Patient's plan of care was discussed; medications were reviewed and discharge planning was addressed. __________________________________________________________________  Trae Hernandez NP     Procedures: see electronic medical records for all procedures/Xrays and details which were not copied into this note but were reviewed prior to creation of Plan. LABS:  I reviewed today's most current labs and imaging studies.   Pertinent labs include:  Recent Labs     11/30/19  2351 11/30/19  1736 11/30/19  0926 11/30/19  0420 11/29/19  1012   WBC  --   --  7.7 6.8 6.8   HGB 9.6* 10.0* 10.5*  10.6* 11.3*  11.4* 13.1   HCT 31.3* 32.3* 34.4*  34.4* 37.4  37.4 43.2   PLT  --   --  222 237 252     Recent Labs     11/30/19  0420 11/29/19  1012    144   K 3.3* 4.4   * 108   CO2 28 34*   * 111*   BUN 24* 24*   CREA 0.89 0.96   CA 7.7* 7.9*       Signed: Trae Hernandez NP      ;e

## 2019-12-01 NOTE — PROGRESS NOTES
Problem: Falls - Risk of  Goal: *Absence of Falls  Description  Document Montez Forde Fall Risk and appropriate interventions in the flowsheet.   Outcome: Progressing Towards Goal  Note: Fall Risk Interventions:  Mobility Interventions: Assess mobility with egress test, Patient to call before getting OOB         Medication Interventions: Assess postural VS orthostatic hypotension, Patient to call before getting OOB, Teach patient to arise slowly         History of Falls Interventions: Consult care management for discharge planning, Door open when patient unattended, Evaluate medications/consider consulting pharmacy, Investigate reason for fall, Room close to nurse's station         Problem: Patient Education: Go to Patient Education Activity  Goal: Patient/Family Education  Outcome: Progressing Towards Goal

## 2019-12-01 NOTE — PROGRESS NOTES
Patient to transfer to general surgery unit prior to 3pm.    TRANSFER - OUT REPORT:    Verbal report given to Clari AMADO RN(name) on Alejandro Johansen  being transferred to General Surgery room 2116(unit) for routine progression of care       Report consisted of patients Situation, Background, Assessment and   Recommendations(SBAR). Information from the following report(s) SBAR, Kardex, Procedure Summary, Intake/Output, MAR, Accordion, Recent Results, Med Rec Status and Cardiac Rhythm NSR, sinus tach was reviewed with the receiving nurse. Lines:   Peripheral IV 11/30/19 Right Forearm (Active)   Site Assessment Clean, dry, & intact 12/1/2019  8:10 AM   Phlebitis Assessment 0 12/1/2019  8:10 AM   Infiltration Assessment 0 12/1/2019  8:10 AM   Dressing Status Clean, dry, & intact 12/1/2019  8:10 AM   Dressing Type Tape;Transparent 12/1/2019  8:10 AM   Hub Color/Line Status Pink;Flushed; Infusing 12/1/2019  8:10 AM   Action Taken Other (comment) 11/30/2019  9:48 AM        Opportunity for questions and clarification was provided.       Patient transported with:   Monitor  Registered Nurse  Tech

## 2019-12-02 ENCOUNTER — ANESTHESIA EVENT (OUTPATIENT)
Dept: ENDOSCOPY | Age: 54
DRG: 244 | End: 2019-12-02
Payer: MEDICAID

## 2019-12-02 LAB
ANION GAP SERPL CALC-SCNC: 3 MMOL/L (ref 5–15)
BASOPHILS # BLD: 0 K/UL (ref 0–0.1)
BASOPHILS NFR BLD: 0 % (ref 0–1)
BUN SERPL-MCNC: 7 MG/DL (ref 6–20)
BUN/CREAT SERPL: 11 (ref 12–20)
CALCIUM SERPL-MCNC: 7.9 MG/DL (ref 8.5–10.1)
CHLORIDE SERPL-SCNC: 106 MMOL/L (ref 97–108)
CO2 SERPL-SCNC: 33 MMOL/L (ref 21–32)
CREAT SERPL-MCNC: 0.65 MG/DL (ref 0.7–1.3)
DIFFERENTIAL METHOD BLD: ABNORMAL
EOSINOPHIL # BLD: 0.3 K/UL (ref 0–0.4)
EOSINOPHIL NFR BLD: 4 % (ref 0–7)
ERYTHROCYTE [DISTWIDTH] IN BLOOD BY AUTOMATED COUNT: 13.3 % (ref 11.5–14.5)
GLUCOSE SERPL-MCNC: 93 MG/DL (ref 65–100)
HCT VFR BLD AUTO: 29.9 % (ref 36.6–50.3)
HGB BLD-MCNC: 9 G/DL (ref 12.1–17)
IMM GRANULOCYTES # BLD AUTO: 0 K/UL (ref 0–0.04)
IMM GRANULOCYTES NFR BLD AUTO: 0 % (ref 0–0.5)
LYMPHOCYTES # BLD: 1 K/UL (ref 0.8–3.5)
LYMPHOCYTES NFR BLD: 17 % (ref 12–49)
MCH RBC QN AUTO: 27.6 PG (ref 26–34)
MCHC RBC AUTO-ENTMCNC: 30.1 G/DL (ref 30–36.5)
MCV RBC AUTO: 91.7 FL (ref 80–99)
MONOCYTES # BLD: 0.7 K/UL (ref 0–1)
MONOCYTES NFR BLD: 11 % (ref 5–13)
NEUTS SEG # BLD: 3.8 K/UL (ref 1.8–8)
NEUTS SEG NFR BLD: 68 % (ref 32–75)
NRBC # BLD: 0 K/UL (ref 0–0.01)
NRBC BLD-RTO: 0 PER 100 WBC
PLATELET # BLD AUTO: 230 K/UL (ref 150–400)
PMV BLD AUTO: 9.4 FL (ref 8.9–12.9)
POTASSIUM SERPL-SCNC: 3.7 MMOL/L (ref 3.5–5.1)
RBC # BLD AUTO: 3.26 M/UL (ref 4.1–5.7)
SODIUM SERPL-SCNC: 142 MMOL/L (ref 136–145)
WBC # BLD AUTO: 5.8 K/UL (ref 4.1–11.1)

## 2019-12-02 PROCEDURE — 74011250637 HC RX REV CODE- 250/637: Performed by: NURSE PRACTITIONER

## 2019-12-02 PROCEDURE — 74011250636 HC RX REV CODE- 250/636: Performed by: HOSPITALIST

## 2019-12-02 PROCEDURE — 74011250637 HC RX REV CODE- 250/637: Performed by: HOSPITALIST

## 2019-12-02 PROCEDURE — 36415 COLL VENOUS BLD VENIPUNCTURE: CPT

## 2019-12-02 PROCEDURE — 85025 COMPLETE CBC W/AUTO DIFF WBC: CPT

## 2019-12-02 PROCEDURE — 74011000250 HC RX REV CODE- 250: Performed by: HOSPITALIST

## 2019-12-02 PROCEDURE — 94760 N-INVAS EAR/PLS OXIMETRY 1: CPT

## 2019-12-02 PROCEDURE — 65270000029 HC RM PRIVATE

## 2019-12-02 PROCEDURE — 80048 BASIC METABOLIC PNL TOTAL CA: CPT

## 2019-12-02 PROCEDURE — C9113 INJ PANTOPRAZOLE SODIUM, VIA: HCPCS | Performed by: HOSPITALIST

## 2019-12-02 RX ORDER — ONDANSETRON 2 MG/ML
4 INJECTION INTRAMUSCULAR; INTRAVENOUS
Status: DISCONTINUED | OUTPATIENT
Start: 2019-12-02 | End: 2019-12-03 | Stop reason: HOSPADM

## 2019-12-02 RX ADMIN — PANTOPRAZOLE SODIUM 40 MG: 40 INJECTION, POWDER, FOR SOLUTION INTRAVENOUS at 09:10

## 2019-12-02 RX ADMIN — Medication 10 ML: at 06:08

## 2019-12-02 RX ADMIN — Medication 10 ML: at 17:14

## 2019-12-02 RX ADMIN — ATORVASTATIN CALCIUM 10 MG: 10 TABLET, FILM COATED ORAL at 21:21

## 2019-12-02 RX ADMIN — Medication 10 ML: at 21:21

## 2019-12-02 RX ADMIN — SODIUM CHLORIDE 100 ML/HR: 900 INJECTION, SOLUTION INTRAVENOUS at 09:10

## 2019-12-02 RX ADMIN — ATENOLOL 25 MG: 25 TABLET ORAL at 09:10

## 2019-12-02 RX ADMIN — PANTOPRAZOLE SODIUM 40 MG: 40 INJECTION, POWDER, FOR SOLUTION INTRAVENOUS at 21:22

## 2019-12-02 RX ADMIN — SODIUM CHLORIDE 100 ML/HR: 900 INJECTION, SOLUTION INTRAVENOUS at 18:43

## 2019-12-02 RX ADMIN — ONDANSETRON 4 MG: 2 INJECTION INTRAMUSCULAR; INTRAVENOUS at 06:08

## 2019-12-02 NOTE — PROGRESS NOTES
GI PROGRESS NOTE      NAME:   Neto Logan   :    1965   MRN:    [id number]     Subjective:     Did not stop the liquids PO.  Just completed prep. Hgb is 9.4. Objective:     VITALS:   Last 24hrs VS reviewed since prior hospitalist progress note. Most recent are:  Visit Vitals  /84   Pulse 92   Temp 97.7 °F (36.5 °C)   Resp 18   Ht 5' 11\" (1.803 m)   Wt (!) 172.4 kg (380 lb)   SpO2 90%   BMI 53.00 kg/m²       Intake/Output Summary (Last 24 hours) at 2019 1218  Last data filed at 2019 0600  Gross per 24 hour   Intake 3200 ml   Output 400 ml   Net 2800 ml        PHYSICAL EXAM:  General   well developed, obese AAM  EENT  Normocephalic,   Neck   Supple without nodes or mass. No thyromegaly or bruit  Respiratory   Clear To Auscultation bilaterally - no wheezes, rales, rhonchi, or crackles  Cardiology  Regular Rate and Rythmn  - no murmurs, rubs or gallops  Abdominal  Soft, non-tender, obese  Lab Data   No results found for this or any previous visit (from the past 12 hour(s)). Medications: Reviewed    Assessment/Plan     Neto Logan is a 53 y.o.  male who was admitted with rectal bleeding. Clear liquid diet now. Bowel prep completed. Plan for colonoscopy tomorrow as he was not NPO after MN for colonoscopy today. Follow hgb closely.     Attending Physician: Yuni Zaidi MD   Date/Time:  2019

## 2019-12-02 NOTE — PROGRESS NOTES
Hospitalist Progress Note    NAME: Que Clement   :  1965   MRN:  [id number]       Assessment / Plan:  Rectal bleeding  Acute blood loss anemia  CT abdomen/pelvis 19:  No acute findings seen. Liver and spleen appear unremarkable. - GI  Input/assistance appreciated. - Labs were not drawn today. - Check cbc now. - Continue clear liquid diet. - Continue IVF. - Continue pantoprazole 40 mg IV q12h. - Bowel prep was not complete, plan for colonoscopy tomorrow. HTN  Dyslipidemia   - BP acceptable. - Continue atenolol 25 mg po daily. - Continue atorvastatin 10 mg po daily. Hypokalemia  - Resolved 19.  - Labs not drawn today. - Check bmp now. Syncope  CT head 19:  No acute intracranial abnormality.  - No further syncopal episodes after initial episode 19 a.m.      40 or above Morbid obesity / Body mass index is 53 kg/m². Code status: Full  Prophylaxis: SCD's and H2B/PPI  Recommended Disposition: Home w/Family     Subjective:     Chief Complaint / Reason for Physician Visit  No abdominal pain. No further syncopal episodes. Plan of care reviewed with RN. Review of Systems:  Symptom Y/N Comments  Symptom Y/N Comments   Fever/Chills N   Chest Pain N    Poor Appetite N   Edema Y    Cough N   Abdominal Pain N    Sputum N   Joint Pain N    SOB/COTE N   Pruritis/Rash N    Nausea/vomit N   Tolerating PT/OT     Diarrhea N   Tolerating Diet Y    Constipation N   Other       Could NOT obtain due to:      Objective:     VITALS:   Last 24hrs VS reviewed since prior progress note.  Most recent are:  Patient Vitals for the past 24 hrs:   Temp Pulse Resp BP SpO2   19 1448 98.6 °F (37 °C) 84 18 139/80 94 %   19 1244 98.3 °F (36.8 °C) 86 18 140/74 96 %   19 0755 97.7 °F (36.5 °C) 92 18 144/84 90 %   19 0324 98.9 °F (37.2 °C) 84 18 144/77 93 %   19 2353 98.9 °F (37.2 °C) 84 18 144/81 96 %   19 99.2 °F (37.3 °C) 86 18 138/75 91 %       Intake/Output Summary (Last 24 hours) at 12/2/2019 1708  Last data filed at 12/2/2019 0600  Gross per 24 hour   Intake 3200 ml   Output 400 ml   Net 2800 ml        PHYSICAL EXAM:  General:  A/A/O X 3. NAD. HEENT:  Normocephalic. Sclera anicteric. EOMI. Mucous membranes moist.    Chest:  Resps even/unlabored with symmetrical CWE. Air entry full. Lungs CTA. No use of accessory muscles. CV:  RRR. UEs edematous. LEs with 1-2 mm pretibial edema cayden. GI:  Abdomen soft/NT/ND. ABT X 4. No further bleeding or clots. Neurologic:  Nonfocal.  CN II-XII grossly intact. Speech normal.     Psych:  Cooperative. No anxiety or agitation. Skin:  Intact. No rashes or jaundice. Reviewed most current lab test results and cultures  YES  Reviewed most current radiology test results   YES  Review and summation of old records today    NO  Reviewed patient's current orders and MAR    YES  PMH/SH reviewed - no change compared to H&P  ________________________________________________________________________  Care Plan discussed with:    Comments   Patient [address]     Consultant                        Multidiciplinary team rounds were held today with , nursing, pharmacist and clinical coordinator. Patient's plan of care was discussed; medications were reviewed and discharge planning was addressed. __________________________________________________________________  Jenean Aase, NP     Procedures: see electronic medical records for all procedures/Xrays and details which were not copied into this note but were reviewed prior to creation of Plan. LABS:  I reviewed today's most current labs and imaging studies.   Pertinent labs include:  Recent Labs     12/01/19  0916 11/30/19  2351 11/30/19  1736 11/30/19  0926 11/30/19  0420   WBC 5.4  --   --  7.7 6.8   HGB 9.4* 9.6* 10.0* 10.5*  10.6* 11.3*  11.4*   HCT 30.9* 31.3* 32.3* 34.4*  34.4* 37.4 37.4     --   --  222 237     Recent Labs     12/01/19  0916 11/30/19  0420    142   K 3.7 3.3*   * 109*   CO2 29 28   * 183*   BUN 21* 24*   CREA 0.78 0.89   CA 7.1* 7.7*   MG 2.2  --    ALB 2.7*  --    TBILI 0.3  --    SGOT 11*  --    ALT 18  --        Signed: Acacia Bunn NP      ;e

## 2019-12-02 NOTE — PROGRESS NOTES
General Surgery End of Shift Nursing Note    Bedside shift change report given to Carla Engle (oncoming nurse) by [place] (offgoing nurse). Report included the following information SBAR, Kardex, Intake/Output, MAR and Recent Results.       Fidel Wise

## 2019-12-03 ENCOUNTER — ANESTHESIA (OUTPATIENT)
Dept: ENDOSCOPY | Age: 54
DRG: 244 | End: 2019-12-03
Payer: MEDICAID

## 2019-12-03 VITALS
OXYGEN SATURATION: 93 % | TEMPERATURE: 97.9 F | SYSTOLIC BLOOD PRESSURE: 155 MMHG | BODY MASS INDEX: 44.1 KG/M2 | HEIGHT: 71 IN | HEART RATE: 79 BPM | DIASTOLIC BLOOD PRESSURE: 94 MMHG | WEIGHT: 315 LBS | RESPIRATION RATE: 18 BRPM

## 2019-12-03 PROBLEM — E66.01 MORBID OBESITY (HCC): Status: ACTIVE | Noted: 2019-12-03

## 2019-12-03 PROBLEM — D64.9 ANEMIA: Status: ACTIVE | Noted: 2019-12-03

## 2019-12-03 PROBLEM — K57.31 DIVERTICULOSIS OF COLON WITH HEMORRHAGE: Status: ACTIVE | Noted: 2019-12-03

## 2019-12-03 LAB
ANION GAP SERPL CALC-SCNC: 3 MMOL/L (ref 5–15)
BASOPHILS # BLD: 0 K/UL (ref 0–0.1)
BASOPHILS NFR BLD: 0 % (ref 0–1)
BUN SERPL-MCNC: 6 MG/DL (ref 6–20)
BUN/CREAT SERPL: 10 (ref 12–20)
CALCIUM SERPL-MCNC: 8 MG/DL (ref 8.5–10.1)
CHLORIDE SERPL-SCNC: 107 MMOL/L (ref 97–108)
CO2 SERPL-SCNC: 33 MMOL/L (ref 21–32)
CREAT SERPL-MCNC: 0.61 MG/DL (ref 0.7–1.3)
DIFFERENTIAL METHOD BLD: ABNORMAL
EOSINOPHIL # BLD: 0.2 K/UL (ref 0–0.4)
EOSINOPHIL NFR BLD: 4 % (ref 0–7)
ERYTHROCYTE [DISTWIDTH] IN BLOOD BY AUTOMATED COUNT: 13.3 % (ref 11.5–14.5)
GLUCOSE SERPL-MCNC: 92 MG/DL (ref 65–100)
HCT VFR BLD AUTO: 30.7 % (ref 36.6–50.3)
HGB BLD-MCNC: 9.4 G/DL (ref 12.1–17)
IMM GRANULOCYTES # BLD AUTO: 0 K/UL (ref 0–0.04)
IMM GRANULOCYTES NFR BLD AUTO: 0 % (ref 0–0.5)
LYMPHOCYTES # BLD: 1 K/UL (ref 0.8–3.5)
LYMPHOCYTES NFR BLD: 17 % (ref 12–49)
MCH RBC QN AUTO: 27.7 PG (ref 26–34)
MCHC RBC AUTO-ENTMCNC: 30.6 G/DL (ref 30–36.5)
MCV RBC AUTO: 90.6 FL (ref 80–99)
MONOCYTES # BLD: 0.6 K/UL (ref 0–1)
MONOCYTES NFR BLD: 11 % (ref 5–13)
NEUTS SEG # BLD: 3.8 K/UL (ref 1.8–8)
NEUTS SEG NFR BLD: 68 % (ref 32–75)
NRBC # BLD: 0 K/UL (ref 0–0.01)
NRBC BLD-RTO: 0 PER 100 WBC
PLATELET # BLD AUTO: 235 K/UL (ref 150–400)
PMV BLD AUTO: 9.5 FL (ref 8.9–12.9)
POTASSIUM SERPL-SCNC: 3.6 MMOL/L (ref 3.5–5.1)
RBC # BLD AUTO: 3.39 M/UL (ref 4.1–5.7)
SODIUM SERPL-SCNC: 143 MMOL/L (ref 136–145)
WBC # BLD AUTO: 5.7 K/UL (ref 4.1–11.1)

## 2019-12-03 PROCEDURE — 74011000250 HC RX REV CODE- 250: Performed by: HOSPITALIST

## 2019-12-03 PROCEDURE — 85025 COMPLETE CBC W/AUTO DIFF WBC: CPT

## 2019-12-03 PROCEDURE — 36415 COLL VENOUS BLD VENIPUNCTURE: CPT

## 2019-12-03 PROCEDURE — 74011250636 HC RX REV CODE- 250/636: Performed by: NURSE ANESTHETIST, CERTIFIED REGISTERED

## 2019-12-03 PROCEDURE — 80048 BASIC METABOLIC PNL TOTAL CA: CPT

## 2019-12-03 PROCEDURE — 0DJD8ZZ INSPECTION OF LOWER INTESTINAL TRACT, VIA NATURAL OR ARTIFICIAL OPENING ENDOSCOPIC: ICD-10-PCS | Performed by: INTERNAL MEDICINE

## 2019-12-03 PROCEDURE — C9113 INJ PANTOPRAZOLE SODIUM, VIA: HCPCS | Performed by: HOSPITALIST

## 2019-12-03 PROCEDURE — 94760 N-INVAS EAR/PLS OXIMETRY 1: CPT

## 2019-12-03 PROCEDURE — 77030039825 HC MSK NSL PAP SUPERNO2VA VYRM -B: Performed by: NURSE ANESTHETIST, CERTIFIED REGISTERED

## 2019-12-03 PROCEDURE — 74011000250 HC RX REV CODE- 250: Performed by: NURSE ANESTHETIST, CERTIFIED REGISTERED

## 2019-12-03 PROCEDURE — 74011250637 HC RX REV CODE- 250/637: Performed by: NURSE PRACTITIONER

## 2019-12-03 PROCEDURE — 74011250636 HC RX REV CODE- 250/636: Performed by: HOSPITALIST

## 2019-12-03 PROCEDURE — 76040000019: Performed by: INTERNAL MEDICINE

## 2019-12-03 PROCEDURE — 76060000031 HC ANESTHESIA FIRST 0.5 HR: Performed by: INTERNAL MEDICINE

## 2019-12-03 PROCEDURE — 74011250636 HC RX REV CODE- 250/636: Performed by: INTERNAL MEDICINE

## 2019-12-03 RX ORDER — FLUMAZENIL 0.1 MG/ML
0.2 INJECTION INTRAVENOUS
Status: DISCONTINUED | OUTPATIENT
Start: 2019-12-03 | End: 2019-12-03 | Stop reason: SDUPTHER

## 2019-12-03 RX ORDER — MIDAZOLAM HYDROCHLORIDE 1 MG/ML
.25-5 INJECTION, SOLUTION INTRAMUSCULAR; INTRAVENOUS
Status: DISCONTINUED | OUTPATIENT
Start: 2019-12-03 | End: 2019-12-03 | Stop reason: HOSPADM

## 2019-12-03 RX ORDER — SODIUM CHLORIDE 9 MG/ML
75 INJECTION, SOLUTION INTRAVENOUS CONTINUOUS
Status: DISPENSED | OUTPATIENT
Start: 2019-12-03 | End: 2019-12-03

## 2019-12-03 RX ORDER — NALOXONE HYDROCHLORIDE 0.4 MG/ML
0.4 INJECTION, SOLUTION INTRAMUSCULAR; INTRAVENOUS; SUBCUTANEOUS
Status: DISCONTINUED | OUTPATIENT
Start: 2019-12-03 | End: 2019-12-03 | Stop reason: SDUPTHER

## 2019-12-03 RX ORDER — EPINEPHRINE 0.1 MG/ML
1 INJECTION INTRACARDIAC; INTRAVENOUS
Status: DISCONTINUED | OUTPATIENT
Start: 2019-12-03 | End: 2019-12-03 | Stop reason: HOSPADM

## 2019-12-03 RX ORDER — EPINEPHRINE 0.1 MG/ML
1 INJECTION INTRACARDIAC; INTRAVENOUS
Status: DISCONTINUED | OUTPATIENT
Start: 2019-12-03 | End: 2019-12-03 | Stop reason: SDUPTHER

## 2019-12-03 RX ORDER — MIDAZOLAM HYDROCHLORIDE 1 MG/ML
.25-5 INJECTION, SOLUTION INTRAMUSCULAR; INTRAVENOUS
Status: DISCONTINUED | OUTPATIENT
Start: 2019-12-03 | End: 2019-12-03 | Stop reason: SDUPTHER

## 2019-12-03 RX ORDER — SODIUM CHLORIDE 0.9 % (FLUSH) 0.9 %
5-40 SYRINGE (ML) INJECTION AS NEEDED
Status: DISCONTINUED | OUTPATIENT
Start: 2019-12-03 | End: 2019-12-03 | Stop reason: HOSPADM

## 2019-12-03 RX ORDER — SODIUM CHLORIDE 0.9 % (FLUSH) 0.9 %
5-40 SYRINGE (ML) INJECTION EVERY 8 HOURS
Status: DISCONTINUED | OUTPATIENT
Start: 2019-12-03 | End: 2019-12-03 | Stop reason: SDUPTHER

## 2019-12-03 RX ORDER — ATROPINE SULFATE 0.1 MG/ML
0.5 INJECTION INTRAVENOUS
Status: DISCONTINUED | OUTPATIENT
Start: 2019-12-03 | End: 2019-12-03 | Stop reason: HOSPADM

## 2019-12-03 RX ORDER — NALOXONE HYDROCHLORIDE 0.4 MG/ML
0.4 INJECTION, SOLUTION INTRAMUSCULAR; INTRAVENOUS; SUBCUTANEOUS
Status: DISCONTINUED | OUTPATIENT
Start: 2019-12-03 | End: 2019-12-03 | Stop reason: HOSPADM

## 2019-12-03 RX ORDER — ATROPINE SULFATE 0.1 MG/ML
0.5 INJECTION INTRAVENOUS
Status: DISCONTINUED | OUTPATIENT
Start: 2019-12-03 | End: 2019-12-03 | Stop reason: SDUPTHER

## 2019-12-03 RX ORDER — DEXTROMETHORPHAN/PSEUDOEPHED 2.5-7.5/.8
1.2 DROPS ORAL
Status: DISCONTINUED | OUTPATIENT
Start: 2019-12-03 | End: 2019-12-03 | Stop reason: SDUPTHER

## 2019-12-03 RX ORDER — PROPOFOL 10 MG/ML
INJECTION, EMULSION INTRAVENOUS AS NEEDED
Status: DISCONTINUED | OUTPATIENT
Start: 2019-12-03 | End: 2019-12-03 | Stop reason: HOSPADM

## 2019-12-03 RX ORDER — DEXTROMETHORPHAN/PSEUDOEPHED 2.5-7.5/.8
1.2 DROPS ORAL
Status: DISCONTINUED | OUTPATIENT
Start: 2019-12-03 | End: 2019-12-03 | Stop reason: HOSPADM

## 2019-12-03 RX ORDER — OMEPRAZOLE 40 MG/1
40 CAPSULE, DELAYED RELEASE ORAL DAILY
Qty: 30 CAP | Refills: 0 | Status: SHIPPED | OUTPATIENT
Start: 2019-12-03 | End: 2022-08-03

## 2019-12-03 RX ORDER — SODIUM CHLORIDE 0.9 % (FLUSH) 0.9 %
5-40 SYRINGE (ML) INJECTION AS NEEDED
Status: DISCONTINUED | OUTPATIENT
Start: 2019-12-03 | End: 2019-12-03 | Stop reason: SDUPTHER

## 2019-12-03 RX ORDER — ATORVASTATIN CALCIUM 10 MG/1
10 TABLET, FILM COATED ORAL
Qty: 30 TAB | Refills: 0 | Status: SHIPPED | OUTPATIENT
Start: 2019-12-03 | End: 2022-08-03 | Stop reason: SDUPTHER

## 2019-12-03 RX ORDER — SODIUM CHLORIDE 0.9 % (FLUSH) 0.9 %
5-40 SYRINGE (ML) INJECTION EVERY 8 HOURS
Status: DISCONTINUED | OUTPATIENT
Start: 2019-12-03 | End: 2019-12-03 | Stop reason: HOSPADM

## 2019-12-03 RX ORDER — FLUMAZENIL 0.1 MG/ML
0.2 INJECTION INTRAVENOUS
Status: DISCONTINUED | OUTPATIENT
Start: 2019-12-03 | End: 2019-12-03 | Stop reason: HOSPADM

## 2019-12-03 RX ORDER — SODIUM CHLORIDE 9 MG/ML
75 INJECTION, SOLUTION INTRAVENOUS CONTINUOUS
Status: DISCONTINUED | OUTPATIENT
Start: 2019-12-03 | End: 2019-12-03 | Stop reason: SDUPTHER

## 2019-12-03 RX ORDER — LIDOCAINE HYDROCHLORIDE 20 MG/ML
INJECTION, SOLUTION EPIDURAL; INFILTRATION; INTRACAUDAL; PERINEURAL AS NEEDED
Status: DISCONTINUED | OUTPATIENT
Start: 2019-12-03 | End: 2019-12-03 | Stop reason: HOSPADM

## 2019-12-03 RX ADMIN — Medication 10 ML: at 06:53

## 2019-12-03 RX ADMIN — SODIUM CHLORIDE 100 ML/HR: 900 INJECTION, SOLUTION INTRAVENOUS at 04:09

## 2019-12-03 RX ADMIN — PROPOFOL 30 MG: 10 INJECTION, EMULSION INTRAVENOUS at 07:39

## 2019-12-03 RX ADMIN — PANTOPRAZOLE SODIUM 40 MG: 40 INJECTION, POWDER, FOR SOLUTION INTRAVENOUS at 09:11

## 2019-12-03 RX ADMIN — PROPOFOL 140 MG: 10 INJECTION, EMULSION INTRAVENOUS at 07:38

## 2019-12-03 RX ADMIN — ATENOLOL 25 MG: 25 TABLET ORAL at 09:11

## 2019-12-03 RX ADMIN — PROPOFOL 30 MG: 10 INJECTION, EMULSION INTRAVENOUS at 07:41

## 2019-12-03 RX ADMIN — LIDOCAINE HYDROCHLORIDE 50 MG: 20 INJECTION, SOLUTION EPIDURAL; INFILTRATION; INTRACAUDAL; PERINEURAL at 07:38

## 2019-12-03 RX ADMIN — SODIUM CHLORIDE 75 ML/HR: 900 INJECTION, SOLUTION INTRAVENOUS at 07:32

## 2019-12-03 RX ADMIN — PROPOFOL 30 MG: 10 INJECTION, EMULSION INTRAVENOUS at 07:45

## 2019-12-03 NOTE — PROCEDURES
Colonoscopy Procedure Note    Rachel Ramesh  1965  [id number]    Indications:  Please see below. Pre-operative Diagnosis: GI Bleed    Post-operative Diagnosis: Diverticulosis, Presumed Diverticular Bleed, Internal Hemorrhoids, Inadequate Prep      : Nick Ellis MD    Referring Provider: Leidy Ernst MD    Sedation:  MAC anesthesia Propofol        Procedure Details:    After detailed informed consent was obtained with all risks and benefits of procedure explained and preoperative exam completed, the patient was taken to the endoscopy suite and placed in the left lateral decubitus position. Upon sequential sedation as per above, a digital rectal exam was performed  And was normal.  The Olympus videocolonoscope  was inserted in the rectum and carefully advanced to the cecum, which was identified by the ileocecal valve. The quality of preparation was fair. The colonoscope was slowly withdrawn with careful evaluation between folds. Retroflexion in the rectum was performed. Findings:   · No blood in the colon  is noted. · The colon wall is covered with a greenish brown liquid. · Mild sigmoid diverticulosis is noted. · Cecum has no blood but prep is not good. · Small internal hemorrhoids. · No large polyps seen(as allowed by the prep)      Therapies:  none    Specimen:  none     Complications: None were encountered during the procedure. EBL:  None. Recommendations:    -Start a regular diet.  -Can go home today. -Repeat colonoscopy in 3-6 month's time with a better prep.    -Naturally, for new bleeding, unexplained weight loss,change in bowel habits and anemia, an earlier colonoscopy should be considered. Nick Ellis MD  12/3/2019  7:48 AM

## 2019-12-03 NOTE — ROUTINE PROCESS
[facility]  1965  [id number]    Situation:  Verbal report received from: Jeovanny Owen RN  Procedure: Procedure(s):  COLONOSCOPY    Background:    Preoperative diagnosis: gi bleed  Postoperative diagnosis: Diverticulosis, Presumed Diverticular Bleed, Hemorrhoids, Inadequate Prep    :  Dr. Jose Waldron  Assistant(s): Endoscopy Technician-1: Elissa Horvath  Endoscopy RN-1: Sundeep Dumont RN    Specimens: * No specimens in log *  H. Pylori  no    Assessment:  Intra-procedure medications     Anesthesia gave intra-procedure sedation and medications, see anesthesia flow sheet yes    Intravenous fluids: NS@ KVO     Vital signs stable       Abdominal assessment: round and soft       Recommendation:  Discharge patient per MD order.   Return to floor yes   Family or Friend Joselyn wife  At home  Permission to share finding with family or friend yes

## 2019-12-03 NOTE — ANESTHESIA PREPROCEDURE EVALUATION
Relevant Problems   No relevant active problems       Anesthetic History   No history of anesthetic complications            Review of Systems / Medical History  Patient summary reviewed, nursing notes reviewed and pertinent labs reviewed    Pulmonary        Sleep apnea: CPAP        Comments: Non-compliant with CPAP   Neuro/Psych   Within defined limits           Cardiovascular    Hypertension: well controlled              Exercise tolerance: >4 METS  Comments: Last BB at 09:10 yesterday   GI/Hepatic/Renal  Within defined limits              Endo/Other        Morbid obesity     Other Findings   Comments:  gi bleed         Physical Exam    Airway  Mallampati: II  TM Distance: > 6 cm  Neck ROM: normal range of motion   Mouth opening: Normal     Cardiovascular    Rhythm: regular  Rate: normal         Dental    Dentition: Upper dentition intact and Lower dentition intact     Pulmonary      Decreased breath sounds: bilateral           Abdominal  GI exam deferred       Other Findings            Anesthetic Plan    ASA: 3  Anesthesia type: general and total IV anesthesia          Induction: Intravenous  Anesthetic plan and risks discussed with: Patient      Shoaib Pace

## 2019-12-03 NOTE — PROGRESS NOTES
TRANSFER - IN REPORT:    Verbal report received from [place], RN (name) on Nori Mayer  being received from General Surgery room 2116 (unit) for ordered procedure      Report consisted of patients Situation, Background, Assessment and   Recommendations(SBAR). Information from the following report(s) SBAR, Procedure Summary, Intake/Output and MAR was reviewed with the receiving nurse. Opportunity for questions and clarification was provided. Assessment completed upon patients arrival to unit and care assumed.

## 2019-12-03 NOTE — DISCHARGE INSTRUCTIONS
Patient Discharge Instructions     Pt Name  Milagros Snell   Date of Birth 1965   Age  53 y.o. Medical Record Number  [id number]   PCP Georgina Hoang MD    Admit date:  11/29/2019 @    [address] [place] [address]    Room Number  2116/01   Date of Discharge 12/3/2019     Admission Diagnoses:     Diverticulosis of colon with hemorrhage        No Known Allergies     You were admitted to [address] [place] [address] for  Diverticulosis of colon with hemorrhage    YOUR OTHER MEDICAL DIAGNOSES INCLUDE (BUT NOT LIMITED TO ):  Present on Admission:   GI bleed   Diverticulosis of colon with hemorrhage   Hypertension   Anemia   Morbid obesity ([address]      DIET:  Low fat, Low cholesterol       Recommended activity: Activity as tolerated  Follow up : Follow-up Information     Follow up With Specialties Details Why Contact Info    Georgina Hoang MD [facility] In 1 week  [address] [place], [place] [address] [address] Marta Hickey MD Gastroenterology Schedule an appointment as soon as possible for a visit 2 weeks [address]  [phone]        Patient Education        Diverticulosis: Care Instructions  Your Care Instructions  In diverticulosis, pouches called diverticula form in the wall of the large intestine (colon). The pouches do not cause any pain or other symptoms. Most people who have diverticulosis do not know they have it. But the pouches sometimes bleed, and if they become infected, they can cause pain and other symptoms. When this happens, it is called diverticulitis. Diverticula form when pressure pushes the wall of the colon outward at certain weak points. A diet that is too low in fiber can cause diverticula. Follow-up care is a key part of your treatment and safety. Be sure to make and go to all appointments, and call your doctor if you are having problems.  It's also a good idea to know your test results and keep a list of the medicines you take. How can you care for yourself at home? · Include fruits, leafy green vegetables, beans, and whole grains in your diet each day. These foods are high in fiber. · Take a fiber supplement, such as Citrucel or Metamucil, every day if needed. Read and follow all instructions on the label. · Drink plenty of fluids, enough so that your urine is light yellow or clear like water. If you have kidney, heart, or liver disease and have to limit fluids, talk with your doctor before you increase the amount of fluids you drink. · Get at least 30 minutes of exercise on most days of the week. Walking is a good choice. You also may want to do other activities, such as running, swimming, cycling, or playing tennis or team sports. · Cut out foods that cause gas, pain, or other symptoms. When should you call for help? Call your doctor now or seek immediate medical care if:    · You have belly pain.     · You pass maroon or very bloody stools.     · You have a fever.     · You have nausea and vomiting.     · You have unusual changes in your bowel movements or abdominal swelling.     · You have burning pain when you urinate.     · You have abnormal vaginal discharge.     · You have shoulder pain.     · You have cramping pain that does not get better when you have a bowel movement or pass gas.     · You pass gas or stool from your urethra while urinating.    Watch closely for changes in your health, and be sure to contact your doctor if you have any problems. Where can you learn more? Go to http://kiera-josé luis.info/. Enter B815 in the search box to learn more about \"Diverticulosis: Care Instructions. \"  Current as of: November 7, 2018  Content Version: 12.2  © 1234-4059 [facility]. Care instructions adapted under license by [employer] (which disclaims liability or warranty for this information).  If you have questions about a medical condition or this instruction, always ask your healthcare professional. [place] [address] any warranty or liability for your use of this information. · It is important that you take the medication exactly as they are prescribed. · Keep your medication in the bottles provided by the pharmacist and keep a list of the medication names, dosages, and times to be taken in your wallet. · Do not take other medications without consulting your doctor. ADDITIONAL INFORMATION: If you experience any of the following symptoms or have any health problem not listed below, then please call your primary care physician or return to the emergency room if you cannot get hold of your doctor: Fever, chills, nausea, vomiting, diarrhea, change in mentation, falling, bleeding, shortness of breath. I understand that if any problems occur once I am discharged, I am supposed to call my Primary care physician for further care or seek help in the Emergency Department at the nearest Healthcare facility. I have had an opportunity to discuss my clinical issues with my doctor and nursing staff. I understand and acknowledge receipt of the above instructions.                                                                                                                                            Physician's or R.N.'s Signature                                                            Date/Time                                                                                                                                              Patient or Representative Signature                                                 Date/Time

## 2019-12-03 NOTE — H&P
Pre-endoscopy H and P    The patient was seen and examined in the room/pre-op holding area. The airway was assessed and documented. The problem list, past medical history, and medications were reviewed. Patient Active Problem List   Diagnosis Code    Contusion, chest wall S20.219A    Muscle strain of chest wall S29.011A    Hypertension I10    GI bleed K92.2     Social History     Socioeconomic History    Marital status:      Spouse name: Not on file    Number of children: Not on file    Years of education: Not on file    Highest education level: Not on file   Occupational History    Not on file   Social Needs    Financial resource strain: Not on file    Food insecurity:     Worry: Not on file     Inability: Not on file    Transportation needs:     Medical: Not on file     Non-medical: Not on file   Tobacco Use    Smoking status: Never Smoker   Substance and Sexual Activity    Alcohol use:  Yes     Alcohol/week: 1.7 standard drinks     Types: 2 Cans of beer per week     Comment: occasionally    Drug use: Not on file    Sexual activity: Not on file   Lifestyle    Physical activity:     Days per week: Not on file     Minutes per session: Not on file    Stress: Not on file   Relationships    Social connections:     Talks on phone: Not on file     Gets together: Not on file     Attends Hindu service: Not on file     Active member of club or organization: Not on file     Attends meetings of clubs or organizations: Not on file     Relationship status: Not on file    Intimate partner violence:     Fear of current or ex partner: Not on file     Emotionally abused: Not on file     Physically abused: Not on file     Forced sexual activity: Not on file   Other Topics Concern    Not on file   Social History Narrative    Not on file     Past Medical History:   Diagnosis Date    Contusion, chest wall 2/12/2010    Hypertension     Muscle strain of chest wall 2/12/2010         Prior to Admission Medications   Prescriptions Last Dose Informant Patient Reported? Taking? Olmesartan-Amlodipine-HCTZ (TRIBENZOR) 40-10-12.5 mg Tab 11/28/2019 at 0900  Yes No   Sig: Take  by mouth. atenolol (TENORMIN) 25 mg tablet Not Taking at Unknown time  Yes No   Sig: Take 25 mg by mouth daily. naproxen (NAPROSYN) 500 mg tablet Not Taking at Unknown time  No No   Sig: Take 1 Tab by mouth every twelve (12) hours as needed for Pain for up to 20 doses. oxyCODONE-acetaminophen (PERCOCET) 5-325 mg per tablet Not Taking at Unknown time  No No   Sig: Take 1 Tab by mouth every eight (8) hours as needed for Pain for up to 12 doses. Max Daily Amount: 3 Tabs. Facility-Administered Medications: None           The review of systems is:  Negative  for shortness of breath or chest pain      The heart, lungs, and mental status were satisfactory for the administration of deep sedation and for the procedure. I discussed with the patient the objectives, risks, consequences and alternatives to the procedure.       Yuni Zaidi MD  12/3/2019  7:35 AM

## 2019-12-03 NOTE — PROGRESS NOTES
TRANSFER - OUT REPORT:    Verbal report given to Mukund Bryan RN (name) on Opal Tafoya  being transferred to General Surgery room 2116 (unit) for routine progression of care       Report consisted of patients Situation, Background, Assessment and   Recommendations(SBAR). Information from the following report(s) SBAR, Procedure Summary, Intake/Output, MAR and Recent Results was reviewed with the receiving nurse. Lines:   Peripheral IV 11/30/19 Right Forearm (Active)   Site Assessment Clean;Dry 12/2/2019  9:09 AM   Phlebitis Assessment 0 12/2/2019  9:09 AM   Infiltration Assessment 0 12/2/2019  9:09 AM   Dressing Status Clean, dry, & intact 12/2/2019  9:09 AM   Dressing Type Transparent 12/2/2019  9:09 AM   Hub Color/Line Status Infusing;Patent 12/2/2019  9:09 AM   Action Taken Other (comment) 11/30/2019  9:48 AM        Opportunity for questions and clarification was provided.

## 2019-12-03 NOTE — PROGRESS NOTES
H[place] Plan:         *Home with family   *wife to transport pt home    Patient is discharging home today without any needs or concerns. Follow-up appointment is on the AVS.  Wife to transport pt home. Care Management Interventions  PCP Verified by CM: Yes(The patient saw his PCP about 6 months ago )  Mode of Transport at Discharge:  Other (see comment)(The patient's wife will assist with d/c transportation )  Transition of Care Consult (CM Consult): Discharge Planning  MyChart Signup: No  Discharge Durable Medical Equipment: No  Physical Therapy Consult: No  Occupational Therapy Consult: No  Speech Therapy Consult: No  Current Support Network: Lives with Spouse  Confirm Follow Up Transport: Self  Plan discussed with Pt/Family/Caregiver: Yes  Freedom of Choice Offered: Yes  [address] Provided?: No  Discharge Location  Discharge Placement: Home      Al Sor  Ext [phone]

## 2019-12-03 NOTE — PROGRESS NOTES
[place] Castro To whom it may concern:       RE: Christian Vazquez (YOB: 1965)        Dear Sir/Madam:    This is to certify that the above referenced patient was hospitalized at [facility] from 11/29/2019 through 12/3/2019. He is expected to go home and follow up with his Primary Care physician. It is recommended that Mr. Christian Vazquez should be excused from work until 12/9/2019. He may return to work on 12/10/2019 without any restrictions. If you have questions please feel to contact the [facility] hospitalist office at [facility] at [phone].      Sincerely       ________________________________________  Carie Jalloh, RAMIREZ, FNP-C, APRN-BC  Hospitalist, [facility]   [address] [facility], [place], [address]  Tel: [phone]

## 2019-12-03 NOTE — PROGRESS NOTES
General Surgery End of Shift Nursing Note    Bedside shift change report given to Carla Engle  (oncoming nurse) by Nilay Ochoa (offgoing nurse). Report included the following information SBAR, Kardex, Intake/Output, MAR and Recent Results.         Callie Arceo

## 2019-12-03 NOTE — DISCHARGE SUMMARY
Hospitalist Discharge Summary     Patient ID:  Milagros Snell  [id number]  54 y.o.  1965    PCP on record: Georgina Hoang MD    Admit date: 11/29/2019  Discharge date and time: 12/3/2019      DISCHARGE DIAGNOSIS:  Rectal bleeding  Acute blood loss anemia  Diverticulosis  HTN  Dyslipidemia   Hypokalemia  Syncope  40 or above Morbid obesity / Body mass index is 53 kg/m². CONSULTATIONS:  None    Excerpted HPI from H&P of Kalyan Dennis MD:  The patient is a 53-year-old Afro-American gentleman with previous history significant for hypertension and hyperlipidemia, initially felt worse yesterday. The patient states he is unable to describe, but he did not feel well yesterday. This morning, he had four episodes of copious amount of dark red blood from the rectum. According to the patient, when he was coming to the emergency room en route, he became weak and dizzy, and passed out for a few seconds. He denies having any abdominal pain. He does not take any NSAIDs, but takes p.r.n. Tylenol. On further questioning, the patient tells me that he has  two episodes of heartburn last week. He denies having any chest pain or shortness of breath. In the emergency room at [facility], his initial blood work revealed a hemoglobin of 14 with a hematocrit of 42.8. He was given one dose of Protonix and 1 L of fluid was given, then he was sent here. In this emergency room, he received 1 L of IV fluids as well. At this time, he has no symptoms. Denies having any nausea, vomiting, or chest pain.    ______________________________________________________________________  DISCHARGE SUMMARY/HOSPITAL COURSE:  for full details see H&P, daily progress notes, labs, consult notes. 53year old male transferred from [address] due to GI bleed. Pt was seen and evaluated by GI team, Dr. Adam Sandoval. Pt's colonoscopy revealed diverticulosis and small internal hemorrhoids.  Advised pt to continue with heart healthy diet and loose weight through life style modification. Pt has been recommended to follow up with Dr. Noni Westbrook for follow up, repeat colonoscopy in 3-6 months. Pt is medically stable to discharge to home. Rectal bleeding  Acute blood loss anemia  Diverticlosis  CT abdomen/pelvis 11/29/19:  No acute findings seen.  Liver and spleen appear unremarkable. S/p colonoscopy revealed internal hemorrhoids and diverticulosis  Continue with PPI  Continue with miralax  hgb stable 9.4; no active s/sx of bleeding     HTN  Dyslipidemia    Continue atenolol 25 mg po daily and atorvastatin 10 mg po daily.       Hypokalemia  resolved      Syncope  CT head 11/30/19:  No acute intracranial abnormality.  - No further syncopal episodes after initial episode 11/30/19 a.m.        40 or above Morbid obesity / Body mass index is 53 kg/m².        _______________________________________________________________________  Patient seen and examined by me on discharge day. Pertinent Findings:  Gen:    Not in distress  Chest: Clear lungs  CVS:   Regular rhythm. No edema  Abd:  Soft, not distended, not tender  Neuro:  Alert, orient x 4  _______________________________________________________________________  DISCHARGE MEDICATIONS:   Discharge Medication List as of 12/3/2019 11:28 AM      START taking these medications    Details   atorvastatin (LIPITOR) 10 mg tablet Take 1 Tab by mouth nightly. , Print, Disp-30 Tab, R-0      omeprazole (PRILOSEC) 40 mg capsule Take 1 Cap by mouth daily. , Print, Disp-30 Cap, R-0         CONTINUE these medications which have NOT CHANGED    Details   atenolol (TENORMIN) 25 mg tablet Take 25 mg by mouth daily. Historical Med, 25 mg      Olmesartan-Amlodipine-HCTZ (TRIBENZOR) 40-10-12.5 mg Tab Take  by mouth. Historical Med         STOP taking these medications       oxyCODONE-acetaminophen (PERCOCET) 5-325 mg per tablet Comments:   Reason for Stopping:         naproxen (NAPROSYN) 500 mg tablet Comments: Reason for Stopping:               My Recommended Diet, Activity, Wound Care, and follow-up labs are listed in the patient's Discharge Insturctions which I have personally completed and reviewed.     _______________________________________________________________________  DISPOSITION:    Home with Family: y   Home with HH/PT/OT/RN:    SNF/LTC:    ANGELICA:    OTHER:        Condition at Discharge:  Stable  _______________________________________________________________________  Follow up with:   PCP : Jcarlos Bean MD  Follow-up Information     Follow up With Specialties Details Why Contact Info    Jcarlos Bean MD Family Practice Go on 12/11/2019 For hospital follow up appointment at 10:45AM [address] [place] [address]  [phone]      Fili Rosales MD Gastroenterology Schedule an appointment as soon as possible for a visit in 2 weeks Office will call patient to schedule an appointment [address]  [phone]                Total time in minutes spent coordinating this discharge (includes going over instructions, follow-up, prescriptions, and preparing report for sign off to her PCP) :  45 minutes    Signed:  Luis F Kendall NP

## 2019-12-03 NOTE — PROGRESS NOTES
General Surgery End of Shift Nursing Note    Bedside shift change report given to Collins Barron 262 (oncoming nurse) by Bev Norris (offgoing nurse). Report included the following information SBAR, Kardex and MAR. Shift worked:   7a-7p   Summary of shift:    uneventful   Issues for physician to address:   none     Number times ambulated in hallway past shift: 0    Number of times OOB to chair past shift: 0    Pain Management:  Current medication: see mar  Patient states pain is manageable on current pain medication: YES    GI:    Current diet:  DIET CLEAR LIQUID  DIET NPO With Ice Chips    Tolerating current diet: YES  Passing flatus: YES  Last Bowel Movement: today   Appearance: black watery    Respiratory:    Incentive Spirometer at bedside: YES  Patient instructed on use: YES    Patient Safety:    Falls Score: 4  Bed Alarm On? No  Sitter?  No    Uvaldo Levin

## 2019-12-03 NOTE — ANESTHESIA POSTPROCEDURE EVALUATION
Procedure(s):  COLONOSCOPY. general, total IV anesthesia    Anesthesia Post Evaluation        Patient location during evaluation: PACU  Note status: Adequate. Level of consciousness: responsive to verbal stimuli and sleepy but conscious  Pain management: satisfactory to patient  Airway patency: patent  Anesthetic complications: no  Cardiovascular status: acceptable  Respiratory status: acceptable  Hydration status: acceptable  Comments: +Post-Anesthesia Evaluation and Assessment    Patient: Irvin Hill MRN: [id number]  SSN: [id number]   YOB: 1965  Age: 53 y.o. Sex: male      Cardiovascular Function/Vital Signs    BP (!) 181/102   Pulse 87   Temp 36.7 °C (98 °F)   Resp 17   Ht 5' 11\" (1.803 m)   Wt (!) 172.4 kg (380 lb)   SpO2 96%   BMI 53.00 kg/m²     Patient is status post Procedure(s):  COLONOSCOPY. Nausea/Vomiting: Controlled. Postoperative hydration reviewed and adequate. Pain:  Pain Scale 1: Numeric (0 - 10) (12/03/19 0801)  Pain Intensity 1: 0 (12/03/19 0801)   Managed. Neurological Status: At baseline. Mental Status and Level of Consciousness: Arousable. Pulmonary Status:   O2 Device: Room air (12/03/19 0804)   Adequate oxygenation and airway patent. Complications related to anesthesia: None    Post-anesthesia assessment completed. No concerns.     Signed By: Barry Trinidad MD    12/3/2019  Post anesthesia nausea and vomiting:  controlled      Vitals Value Taken Time   /102 12/3/2019  8:07 AM   Temp     Pulse 87 12/3/2019  8:07 AM   Resp 17 12/3/2019  8:07 AM   SpO2 96 % 12/3/2019  8:04 AM

## 2019-12-03 NOTE — PROGRESS NOTES
.Discussed with the patient and all questions answered. Patient  received Discharge instructions and Doctors note and prescriptions. Patient discharging with family to home.

## 2019-12-03 NOTE — PROGRESS NOTES
Anesthesia reports 230 mg Propofol, 50 mg Lidocaine and 200 mL NS given during procedure. Received report from anesthesia staff on vital signs and status of patient.

## 2019-12-04 LAB
ABO + RH BLD: NORMAL
BLD PROD TYP BPU: NORMAL
BLD PROD TYP BPU: NORMAL
BLOOD GROUP ANTIBODIES SERPL: NORMAL
BPU ID: NORMAL
BPU ID: NORMAL
CROSSMATCH RESULT,%XM: NORMAL
CROSSMATCH RESULT,%XM: NORMAL
SPECIMEN EXP DATE BLD: NORMAL
STATUS OF UNIT,%ST: NORMAL
STATUS OF UNIT,%ST: NORMAL
UNIT DIVISION, %UDIV: 0
UNIT DIVISION, %UDIV: 0

## 2021-10-29 ENCOUNTER — APPOINTMENT (OUTPATIENT)
Dept: ULTRASOUND IMAGING | Age: 56
DRG: 291 | End: 2021-10-29
Attending: INTERNAL MEDICINE
Payer: COMMERCIAL

## 2021-10-29 ENCOUNTER — HOSPITAL ENCOUNTER (INPATIENT)
Age: 56
LOS: 4 days | Discharge: HOME | DRG: 291 | End: 2021-11-02
Attending: EMERGENCY MEDICINE | Admitting: INTERNAL MEDICINE
Payer: COMMERCIAL

## 2021-10-29 ENCOUNTER — APPOINTMENT (OUTPATIENT)
Dept: GENERAL RADIOLOGY | Age: 56
DRG: 291 | End: 2021-10-29
Attending: EMERGENCY MEDICINE
Payer: COMMERCIAL

## 2021-10-29 ENCOUNTER — APPOINTMENT (OUTPATIENT)
Dept: CT IMAGING | Age: 56
DRG: 291 | End: 2021-10-29
Attending: EMERGENCY MEDICINE
Payer: COMMERCIAL

## 2021-10-29 DIAGNOSIS — I16.0 HYPERTENSIVE URGENCY: ICD-10-CM

## 2021-10-29 DIAGNOSIS — E66.01 MORBID OBESITY (HCC): ICD-10-CM

## 2021-10-29 DIAGNOSIS — J96.01 ACUTE HYPOXEMIC RESPIRATORY FAILURE (HCC): Primary | ICD-10-CM

## 2021-10-29 LAB
ALBUMIN SERPL-MCNC: 3.5 G/DL (ref 3.5–5)
ALBUMIN/GLOB SERPL: 0.9 {RATIO} (ref 1.1–2.2)
ALP SERPL-CCNC: 70 U/L (ref 45–117)
ALT SERPL-CCNC: 66 U/L (ref 12–78)
ANION GAP SERPL CALC-SCNC: 2 MMOL/L (ref 5–15)
AST SERPL-CCNC: 22 U/L (ref 15–37)
ATRIAL RATE: 92 BPM
BASOPHILS # BLD: 0 K/UL (ref 0–0.1)
BASOPHILS NFR BLD: 0 % (ref 0–1)
BILIRUB SERPL-MCNC: 0.8 MG/DL (ref 0.2–1)
BNP SERPL-MCNC: 159 PG/ML
BUN SERPL-MCNC: 16 MG/DL (ref 6–20)
BUN/CREAT SERPL: 17 (ref 12–20)
CALCIUM SERPL-MCNC: 9.1 MG/DL (ref 8.5–10.1)
CALCULATED P AXIS, ECG09: 55 DEGREES
CALCULATED R AXIS, ECG10: 2 DEGREES
CALCULATED T AXIS, ECG11: 49 DEGREES
CHLORIDE SERPL-SCNC: 100 MMOL/L (ref 97–108)
CK SERPL-CCNC: 107 U/L (ref 39–308)
CO2 SERPL-SCNC: 37 MMOL/L (ref 21–32)
CREAT SERPL-MCNC: 0.92 MG/DL (ref 0.7–1.3)
DIAGNOSIS, 93000: NORMAL
DIFFERENTIAL METHOD BLD: ABNORMAL
EOSINOPHIL # BLD: 0 K/UL (ref 0–0.4)
EOSINOPHIL NFR BLD: 0 % (ref 0–7)
ERYTHROCYTE [DISTWIDTH] IN BLOOD BY AUTOMATED COUNT: 14.2 % (ref 11.5–14.5)
GLOBULIN SER CALC-MCNC: 3.8 G/DL (ref 2–4)
GLUCOSE SERPL-MCNC: 117 MG/DL (ref 65–100)
HCT VFR BLD AUTO: 55.2 % (ref 36.6–50.3)
HGB BLD-MCNC: 16.7 G/DL (ref 12.1–17)
IMM GRANULOCYTES # BLD AUTO: 0 K/UL (ref 0–0.04)
IMM GRANULOCYTES NFR BLD AUTO: 0 % (ref 0–0.5)
LYMPHOCYTES # BLD: 0.4 K/UL (ref 0.8–3.5)
LYMPHOCYTES NFR BLD: 5 % (ref 12–49)
MCH RBC QN AUTO: 27.7 PG (ref 26–34)
MCHC RBC AUTO-ENTMCNC: 30.3 G/DL (ref 30–36.5)
MCV RBC AUTO: 91.7 FL (ref 80–99)
MONOCYTES # BLD: 0.6 K/UL (ref 0–1)
MONOCYTES NFR BLD: 7 % (ref 5–13)
NEUTS SEG # BLD: 7.1 K/UL (ref 1.8–8)
NEUTS SEG NFR BLD: 88 % (ref 32–75)
NRBC # BLD: 0 K/UL (ref 0–0.01)
NRBC BLD-RTO: 0 PER 100 WBC
P-R INTERVAL, ECG05: 152 MS
PLATELET # BLD AUTO: 236 K/UL (ref 150–400)
PMV BLD AUTO: 10.1 FL (ref 8.9–12.9)
POTASSIUM SERPL-SCNC: 3.8 MMOL/L (ref 3.5–5.1)
PROT SERPL-MCNC: 7.3 G/DL (ref 6.4–8.2)
Q-T INTERVAL, ECG07: 360 MS
QRS DURATION, ECG06: 96 MS
QTC CALCULATION (BEZET), ECG08: 445 MS
RBC # BLD AUTO: 6.02 M/UL (ref 4.1–5.7)
RBC MORPH BLD: ABNORMAL
SODIUM SERPL-SCNC: 139 MMOL/L (ref 136–145)
TROPONIN-HIGH SENSITIVITY: 42 NG/L (ref 0–76)
TROPONIN-HIGH SENSITIVITY: 53 NG/L (ref 0–76)
VENTRICULAR RATE, ECG03: 92 BPM
WBC # BLD AUTO: 8.1 K/UL (ref 4.1–11.1)

## 2021-10-29 PROCEDURE — 74011000636 HC RX REV CODE- 636: Performed by: EMERGENCY MEDICINE

## 2021-10-29 PROCEDURE — 85025 COMPLETE CBC W/AUTO DIFF WBC: CPT

## 2021-10-29 PROCEDURE — 36415 COLL VENOUS BLD VENIPUNCTURE: CPT

## 2021-10-29 PROCEDURE — 74011250636 HC RX REV CODE- 250/636: Performed by: INTERNAL MEDICINE

## 2021-10-29 PROCEDURE — 80053 COMPREHEN METABOLIC PANEL: CPT

## 2021-10-29 PROCEDURE — 83880 ASSAY OF NATRIURETIC PEPTIDE: CPT

## 2021-10-29 PROCEDURE — 71045 X-RAY EXAM CHEST 1 VIEW: CPT

## 2021-10-29 PROCEDURE — 76705 ECHO EXAM OF ABDOMEN: CPT

## 2021-10-29 PROCEDURE — 84484 ASSAY OF TROPONIN QUANT: CPT

## 2021-10-29 PROCEDURE — 77010033678 HC OXYGEN DAILY

## 2021-10-29 PROCEDURE — 82550 ASSAY OF CK (CPK): CPT

## 2021-10-29 PROCEDURE — 74011250637 HC RX REV CODE- 250/637: Performed by: EMERGENCY MEDICINE

## 2021-10-29 PROCEDURE — 93005 ELECTROCARDIOGRAM TRACING: CPT

## 2021-10-29 PROCEDURE — 65660000000 HC RM CCU STEPDOWN

## 2021-10-29 PROCEDURE — 74011250637 HC RX REV CODE- 250/637: Performed by: INTERNAL MEDICINE

## 2021-10-29 PROCEDURE — 96374 THER/PROPH/DIAG INJ IV PUSH: CPT

## 2021-10-29 PROCEDURE — 99285 EMERGENCY DEPT VISIT HI MDM: CPT

## 2021-10-29 PROCEDURE — 71275 CT ANGIOGRAPHY CHEST: CPT

## 2021-10-29 PROCEDURE — 74011250636 HC RX REV CODE- 250/636: Performed by: EMERGENCY MEDICINE

## 2021-10-29 PROCEDURE — 74011250636 HC RX REV CODE- 250/636: Performed by: NURSE PRACTITIONER

## 2021-10-29 RX ORDER — SPIRONOLACTONE 50 MG/1
50 TABLET, FILM COATED ORAL DAILY
COMMUNITY
End: 2021-11-02

## 2021-10-29 RX ORDER — POLYETHYLENE GLYCOL 3350 17 G/17G
17 POWDER, FOR SOLUTION ORAL DAILY PRN
Status: DISCONTINUED | OUTPATIENT
Start: 2021-10-29 | End: 2021-11-02 | Stop reason: HOSPADM

## 2021-10-29 RX ORDER — FUROSEMIDE 10 MG/ML
60 INJECTION INTRAMUSCULAR; INTRAVENOUS ONCE
Status: COMPLETED | OUTPATIENT
Start: 2021-10-29 | End: 2021-10-29

## 2021-10-29 RX ORDER — LABETALOL HYDROCHLORIDE 5 MG/ML
10 INJECTION, SOLUTION INTRAVENOUS
Status: DISCONTINUED | OUTPATIENT
Start: 2021-10-29 | End: 2021-11-02 | Stop reason: HOSPADM

## 2021-10-29 RX ORDER — HYDROCHLOROTHIAZIDE 25 MG/1
25 TABLET ORAL DAILY
COMMUNITY
End: 2021-11-02

## 2021-10-29 RX ORDER — ATENOLOL 25 MG/1
25 TABLET ORAL DAILY
Status: DISCONTINUED | OUTPATIENT
Start: 2021-10-30 | End: 2021-11-02 | Stop reason: HOSPADM

## 2021-10-29 RX ORDER — LEVOTHYROXINE SODIUM 50 UG/1
50 TABLET ORAL
COMMUNITY

## 2021-10-29 RX ORDER — ACETAMINOPHEN 325 MG/1
650 TABLET ORAL
Status: DISCONTINUED | OUTPATIENT
Start: 2021-10-29 | End: 2021-11-02 | Stop reason: HOSPADM

## 2021-10-29 RX ORDER — PREDNISONE 10 MG/1
10 TABLET ORAL DAILY
COMMUNITY
Start: 2021-10-21 | End: 2021-11-02

## 2021-10-29 RX ORDER — SODIUM CHLORIDE 0.9 % (FLUSH) 0.9 %
5-40 SYRINGE (ML) INJECTION EVERY 8 HOURS
Status: DISCONTINUED | OUTPATIENT
Start: 2021-10-29 | End: 2021-11-02 | Stop reason: HOSPADM

## 2021-10-29 RX ORDER — HYDRALAZINE HYDROCHLORIDE 20 MG/ML
10 INJECTION INTRAMUSCULAR; INTRAVENOUS
Status: DISCONTINUED | OUTPATIENT
Start: 2021-10-29 | End: 2021-11-02 | Stop reason: HOSPADM

## 2021-10-29 RX ORDER — CARVEDILOL 25 MG/1
25 TABLET ORAL 2 TIMES DAILY
COMMUNITY

## 2021-10-29 RX ORDER — LOSARTAN POTASSIUM 100 MG/1
100 TABLET ORAL DAILY
Status: DISCONTINUED | OUTPATIENT
Start: 2021-10-30 | End: 2021-11-02 | Stop reason: HOSPADM

## 2021-10-29 RX ORDER — ACETAMINOPHEN 650 MG/1
650 SUPPOSITORY RECTAL
Status: DISCONTINUED | OUTPATIENT
Start: 2021-10-29 | End: 2021-11-02 | Stop reason: HOSPADM

## 2021-10-29 RX ORDER — FUROSEMIDE 20 MG/1
20 TABLET ORAL DAILY
COMMUNITY
End: 2021-11-02

## 2021-10-29 RX ORDER — HYDROCHLOROTHIAZIDE 12.5 MG/1
12.5 CAPSULE ORAL DAILY
Status: DISCONTINUED | OUTPATIENT
Start: 2021-10-30 | End: 2021-11-02 | Stop reason: HOSPADM

## 2021-10-29 RX ORDER — ONDANSETRON 2 MG/ML
4 INJECTION INTRAMUSCULAR; INTRAVENOUS
Status: DISCONTINUED | OUTPATIENT
Start: 2021-10-29 | End: 2021-11-02 | Stop reason: HOSPADM

## 2021-10-29 RX ORDER — PANTOPRAZOLE SODIUM 40 MG/1
40 TABLET, DELAYED RELEASE ORAL
Status: DISCONTINUED | OUTPATIENT
Start: 2021-10-30 | End: 2021-11-02 | Stop reason: HOSPADM

## 2021-10-29 RX ORDER — FUROSEMIDE 10 MG/ML
40 INJECTION INTRAMUSCULAR; INTRAVENOUS 2 TIMES DAILY
Status: DISCONTINUED | OUTPATIENT
Start: 2021-10-29 | End: 2021-11-02 | Stop reason: HOSPADM

## 2021-10-29 RX ORDER — HYDRALAZINE HYDROCHLORIDE 50 MG/1
50 TABLET, FILM COATED ORAL 2 TIMES DAILY
COMMUNITY
End: 2021-11-02

## 2021-10-29 RX ORDER — ENOXAPARIN SODIUM 100 MG/ML
40 INJECTION SUBCUTANEOUS EVERY 12 HOURS
Status: DISCONTINUED | OUTPATIENT
Start: 2021-10-23 | End: 2021-11-02 | Stop reason: HOSPADM

## 2021-10-29 RX ORDER — BUDESONIDE AND FORMOTEROL FUMARATE DIHYDRATE 160; 4.5 UG/1; UG/1
2 AEROSOL RESPIRATORY (INHALATION) 2 TIMES DAILY
COMMUNITY
End: 2022-08-03

## 2021-10-29 RX ORDER — ENOXAPARIN SODIUM 100 MG/ML
40 INJECTION SUBCUTANEOUS DAILY
Status: DISCONTINUED | OUTPATIENT
Start: 2021-10-30 | End: 2021-10-29

## 2021-10-29 RX ORDER — ONDANSETRON 4 MG/1
4 TABLET, ORALLY DISINTEGRATING ORAL
Status: DISCONTINUED | OUTPATIENT
Start: 2021-10-29 | End: 2021-11-02 | Stop reason: HOSPADM

## 2021-10-29 RX ORDER — SODIUM CHLORIDE 0.9 % (FLUSH) 0.9 %
5-40 SYRINGE (ML) INJECTION AS NEEDED
Status: DISCONTINUED | OUTPATIENT
Start: 2021-10-29 | End: 2021-11-02 | Stop reason: HOSPADM

## 2021-10-29 RX ORDER — ATORVASTATIN CALCIUM 10 MG/1
10 TABLET, FILM COATED ORAL
Status: DISCONTINUED | OUTPATIENT
Start: 2021-10-29 | End: 2021-11-02 | Stop reason: HOSPADM

## 2021-10-29 RX ORDER — AMLODIPINE BESYLATE 5 MG/1
10 TABLET ORAL DAILY
Status: DISCONTINUED | OUTPATIENT
Start: 2021-10-30 | End: 2021-11-02 | Stop reason: HOSPADM

## 2021-10-29 RX ADMIN — FUROSEMIDE 40 MG: 10 INJECTION, SOLUTION INTRAMUSCULAR; INTRAVENOUS at 18:57

## 2021-10-29 RX ADMIN — IOPAMIDOL 94 ML: 755 INJECTION, SOLUTION INTRAVENOUS at 12:50

## 2021-10-29 RX ADMIN — FUROSEMIDE 60 MG: 10 INJECTION, SOLUTION INTRAMUSCULAR; INTRAVENOUS at 09:50

## 2021-10-29 RX ADMIN — ENOXAPARIN SODIUM 40 MG: 100 INJECTION SUBCUTANEOUS at 21:30

## 2021-10-29 RX ADMIN — ATORVASTATIN CALCIUM 10 MG: 10 TABLET, FILM COATED ORAL at 21:31

## 2021-10-29 RX ADMIN — HYDRALAZINE HYDROCHLORIDE 10 MG: 20 INJECTION INTRAMUSCULAR; INTRAVENOUS at 21:31

## 2021-10-29 RX ADMIN — NITROGLYCERIN 1 INCH: 20 OINTMENT TOPICAL at 09:49

## 2021-10-29 RX ADMIN — Medication 10 ML: at 18:59

## 2021-10-29 RX ADMIN — Medication 10 ML: at 21:31

## 2021-10-29 NOTE — PROGRESS NOTES
Transition of Care Plan:    RUR: Not listed on chart at this time   Disposition: Home with spouse  Follow up appointments: PCP  DME needed: None anticipated  Transportation at Discharge: Pt has vehicle at hospital, will transport self home. [place] or means to access home: Pt has access      IM Medicare Letter: N/A - commercial insurance   Is patient a BCPI-A Bundle: No         If yes, was Bundle Letter given?:     Caregiver Contact: Pt's spouse, Kimberlee Garcia [phone]  Discharge Caregiver contacted prior to discharge? Unit CM to follow. Reason for Admission:  Shortness of breath likely multifactorial from pulmonary hypertension, hypertension, morbid obesity anxiety undiagnosed obstructive sleep apnea/obesity hypoventilation syndrome                      RUR Score: Not listed on chart at this time                     Plan for utilizing home health:  No home health needs identified        PCP: First and Last name:  Sania Laboy MD     Name of Practice: [address]    Are you a current patient: Yes/No: Yes   Approximate date of last visit: Last week    Can you participate in a virtual visit with your PCP: Yes if needed                    Current Advanced Directive/Advance Care Plan: Full Code   [address] (ACP) Conversation      Date of Conversation: 10/29/2021  Conducted with: Patient with Decision Making Capacity    Healthcare Decision Maker: Pt's spouse is legal next of kin. No healthcare decision makers have been documented. Click here to complete [place] including selection of the Healthcare Decision Maker Relationship (ie \"Primary\")    Today we documented Decision Maker(s) consistent with Legal Next of Kin hierarchy.     Content/Action Overview:   Has NO ACP documents/care preferences - information provided, considering goals and options  Reviewed DNR/DNI and patient elects Full Code (Attempt Resuscitation)      Length of Voluntary ACP Conversation in minutes:  <16 minutes (Non-Billable)    [place]:   Spouse is legal next of kin, education and blank copy of AMD provided to patient to consider options. Transition of Care Plan: Home with spouse, outpatient follow up                      CM reviewed chart. CM completed assessment with pt at bedside. CM introduced self/role, verified demographics, and discussed discharge planning. Pt resides with his spouse and is independent with ADLs/IADLs to include driving. No DME use. Pt is currently on oxygen, no home oxygen use. Pt is employed full time as a  for [employer], reports long days and a hectic schedule. Pt voices wanting resources/support for his diet, nutrition, and lifestyle. CM has placed perfect serve message to attending provider to notify of this and suggest consulting a dietitian while the patient is hospitalized to provide this support. Attending voices agreement with this. Pt drove himself to the hospital, will drive himself home at d/c. Pt voiced no concerns with transition of care plan at this time. No barriers identified by CM. Unit care management will continue to follow for transition of care planning needs. Care Management Interventions  PCP Verified by CM:  Yes  Palliative Care Criteria Met (RRAT>21 & CHF Dx)?: No  Mode of Transport at Discharge: Self (Will drive self; has vehicle in parking lot )  Transition of Care Consult (CM Consult): Discharge Planning  Discharge Durable Medical Equipment: No  Physical Therapy Consult: No  Occupational Therapy Consult: No  Speech Therapy Consult: No  Support Systems: Spouse/Significant Other  Confirm Follow Up Transport: Self  Discharge Location  Discharge Placement: [address] [place] [address] [address], [address]

## 2021-10-29 NOTE — ED NOTES
Patient is being transferred to [facility], Room # 2184. Report given to Jenise, [facility] on [address] for routine progression of care. Report consisted of the following information SBAR, ED Summary, MAR and Recent Results. Patient transferred to receiving unit by: rn management (RN or tech name). Outstanding consults needed: No     Next labs due: No     The following personal items will be sent with the patient during transfer to the floor: All valuables:    Cardiac monitoring ordered: Yes    The following CURRENT information was reported to the receiving RN:    Code status: Full Code at time of transfer    Last set of vital signs:  Vital Signs  Level of Consciousness: Alert (0) (10/29/21 1500)  Temp: 98 °F (36.7 °C) (10/29/21 1500)  Temp Source: Oral (10/29/21 1500)  Pulse (Heart Rate): 83 (10/29/21 1500)  Resp Rate: 17 (10/29/21 1500)  BP: (!) 179/120 (10/29/21 1500)  MAP (Monitor): 132 (10/29/21 1500)  MAP (Calculated): 140 (10/29/21 1500)  BP 1 Location: Left upper arm (10/29/21 0900)  BP 1 Method: Automatic (10/29/21 0900)  BP Patient Position: At rest (10/29/21 0900)  MEWS Score: 1 (10/29/21 1500)         Oxygen Therapy  O2 Sat (%): 94 % (10/29/21 1500)  Pulse via Oximetry: 85 beats per minute (10/29/21 1500)  O2 Device: Nasal cannula (10/29/21 1500)  O2 Flow Rate (L/min): 2 l/min (10/29/21 1500)      Last pain assessment:  Pain 1  Pain Scale 1: Numeric (0 - 10)  Pain Intensity 1: 0      Wounds: No     Urinary catheter: voiding  Is there a kothari order: No     LDAs:       Peripheral IV 10/29/21 Left Antecubital (Active)         Opportunity for questions and clarification was provided.     Beto Arnold RN

## 2021-10-29 NOTE — PROGRESS NOTES
TRANSFER - IN REPORT:    Verbal report received from Yifan Roberts (name) on Ju Singletonist  being received from ED (unit) for routine progression of care      Report consisted of patients Situation, Background, Assessment and   Recommendations(SBAR). Information from the following report(s) SBAR, Kardex, Intake/Output, MAR and Cardiac Rhythm NS was reviewed with the receiving nurse. Opportunity for questions and clarification was provided. Assessment completed upon patients arrival to unit and care assumed.

## 2021-10-29 NOTE — ED PROVIDER NOTES
EMERGENCY DEPARTMENT HISTORY AND PHYSICAL EXAM      Date: 10/29/2021  Patient Name: Ju Bass  Patient Age and Sex: 61 y.o. male  MRN:  [id number]  CSN:  [id number]    History of Presenting Illness     Chief Complaint   Patient presents with    Shortness of Breath     Pt reports he feels like he cannot catch his breath, this has been ongoing. Reports inhaler is not helping. History Provided By: Patient    Ability to gather history was limited by:     HPI: Ju Bass, 61 y.o. male with history of intractable hypertension, morbid obesity (180 kg), complains of shortness of breath. Symptoms are moderate severity, waxing and waning, at times severe and causing lightheadedness, other times minimal.  Symptoms have been waxing and waning for months. This morning while ambulating to his car at work he became very lightheaded and winded. He reports bilateral lower extremity edema which has been chronically worsening. Reports that his primary care physician recently increased his Lasix dose. He also uses multiple inhalers. Location:    Quality:      Severity:    Duration:   Timing:      Context:    Modifying factors:   Associated symptoms:     Past History      The patient's medical, surgical, and social history on file were reviewed by me today.      The family history was reviewed by me today and was non-contributory, unless otherwise specified below:    Past Medical History:  Past Medical History:   Diagnosis Date    Contusion, chest wall 2/12/2010    Hypertension     Muscle strain of chest wall 2/12/2010       Past Surgical History:  Past Surgical History:   Procedure Laterality Date    COLONOSCOPY N/A 12/3/2019    COLONOSCOPY performed by [facility]hendra Calderón MD at [facility] ENDOSCOPY    HX CHOLECYSTECTOMY         Family History:  Family History   Problem Relation Age of Onset    Diabetes Mother     Hypertension Father     Diabetes Father        Social History:  Social History     Tobacco Use    Smoking status: Never Smoker   Substance Use Topics    Alcohol use: Yes     Alcohol/week: 1.7 standard drinks     Types: 2 Cans of beer per week     Comment: occasionally    Drug use: Not on file       Current Medications:  No current facility-administered medications on file prior to encounter. Current Outpatient Medications on File Prior to Encounter   Medication Sig Dispense Refill    atorvastatin (LIPITOR) 10 mg tablet Take 1 Tab by mouth nightly. 30 Tab 0    omeprazole (PRILOSEC) 40 mg capsule Take 1 Cap by mouth daily. 30 Cap 0    atenolol (TENORMIN) 25 mg tablet Take 25 mg by mouth daily.  Olmesartan-Amlodipine-HCTZ (TRIBENZOR) 40-10-12.5 mg Tab Take  by mouth. Allergies:  No Known Allergies  Review of Systems    A complete ROS was reviewed by me today and was negative, unless otherwise specified below:    Review of Systems   Constitutional: Positive for fatigue. Negative for fever. Respiratory: Positive for shortness of breath. Negative for chest tightness and wheezing. Cardiovascular: Positive for leg swelling. Negative for chest pain and palpitations. Gastrointestinal: Negative for abdominal pain. Neurological: Negative for headaches. All other systems reviewed and are negative. Physical Exam   Vital Signs  Patient Vitals for the past 8 hrs:   Temp Pulse Resp BP SpO2   10/29/21 1330 -- 83 23 (!) 196/116 92 %   10/29/21 1308 -- 85 21 -- 92 %   10/29/21 1300 -- -- -- (!) 191/119 --   10/29/21 1130 -- 85 25 (!) 171/127 92 %   10/29/21 1100 -- 89 18 (!) 195/120 91 %   10/29/21 1033 -- -- -- -- 91 %   10/29/21 1030 -- 87 (!) 31 (!) 201/128 (!) 88 %   10/29/21 0942 -- 93 -- (!) 213/147 90 %   10/29/21 0900 98 °F (36.7 °C) 95 18 (!) 202/129 (!) 88 %          Physical Exam  Vitals and nursing note reviewed. Constitutional:       General: He is not in acute distress. Appearance: Normal appearance. He is well-developed. He is obese. He is not ill-appearing.       Comments: Morbidly obese   HENT:      Head: Normocephalic and atraumatic. Eyes:      General:         Right eye: No discharge. Left eye: No discharge. Conjunctiva/sclera: Conjunctivae normal.   Cardiovascular:      Rate and Rhythm: Normal rate and regular rhythm. Heart sounds: Normal heart sounds. No murmur heard. Pulmonary:      Effort: Pulmonary effort is normal. No respiratory distress. Breath sounds: Normal breath sounds. No wheezing, rhonchi or rales. Comments: Lungs are clear by exam, no significant wheezing or rales  Abdominal:      General: There is no distension. Palpations: Abdomen is soft. Tenderness: There is no abdominal tenderness. Musculoskeletal:         General: No deformity. Normal range of motion. Cervical back: Normal range of motion and neck supple. Right lower leg: Edema ( 2+ bilateral lower extremity edema) present. Left lower leg: Edema present. Skin:     General: Skin is warm and dry. Findings: No rash. Neurological:      General: No focal deficit present. Mental Status: He is alert and oriented to person, place, and time. Psychiatric:         Mood and Affect: Mood normal.         Behavior: Behavior normal.         Thought Content:  Thought content normal.         Diagnostic Study Results   Labs  Recent Results (from the past 24 hour(s))   EKG, 12 LEAD, INITIAL    Collection Time: 10/29/21  9:07 AM   Result Value Ref Range    Ventricular Rate 92 BPM    Atrial Rate 92 BPM    P-R Interval 152 ms    QRS Duration 96 ms    Q-T Interval 360 ms    QTC Calculation (Bezet) 445 ms    Calculated P Axis 55 degrees    Calculated R Axis 2 degrees    Calculated T Axis 49 degrees    Diagnosis       Normal sinus rhythm  Normal ECG  No previous ECGs available  Confirmed by Jyothi Fritz ([id number]) on 10/29/2021 1:02:51 PM     CBC WITH AUTOMATED DIFF    Collection Time: 10/29/21  9:43 AM   Result Value Ref Range    WBC 8.1 4.1 - 11.1 K/uL    RBC 6.02 (H) 4.10 - 5.70 M/uL    HGB 16.7 12.1 - 17.0 g/dL    HCT 55.2 (H) 36.6 - 50.3 %    MCV 91.7 80.0 - 99.0 FL    MCH 27.7 26.0 - 34.0 PG    MCHC 30.3 30.0 - 36.5 g/dL    RDW 14.2 11.5 - 14.5 %    PLATELET 173 995 - 409 K/uL    MPV 10.1 8.9 - 12.9 FL    NRBC 0.0 0  WBC    ABSOLUTE NRBC 0.00 0.00 - 0.01 K/uL    NEUTROPHILS 88 (H) 32 - 75 %    LYMPHOCYTES 5 (L) 12 - 49 %    MONOCYTES 7 5 - 13 %    EOSINOPHILS 0 0 - 7 %    BASOPHILS 0 0 - 1 %    IMMATURE GRANULOCYTES 0 0.0 - 0.5 %    ABS. NEUTROPHILS 7.1 1.8 - 8.0 K/UL    ABS. LYMPHOCYTES 0.4 (L) 0.8 - 3.5 K/UL    ABS. MONOCYTES 0.6 0.0 - 1.0 K/UL    ABS. EOSINOPHILS 0.0 0.0 - 0.4 K/UL    ABS. BASOPHILS 0.0 0.0 - 0.1 K/UL    ABS. IMM. GRANS. 0.0 0.00 - 0.04 K/UL    DF SMEAR SCANNED      RBC COMMENTS NORMOCYTIC, NORMOCHROMIC     METABOLIC PANEL, COMPREHENSIVE    Collection Time: 10/29/21  9:43 AM   Result Value Ref Range    Sodium 139 136 - 145 mmol/L    Potassium 3.8 3.5 - 5.1 mmol/L    Chloride 100 97 - 108 mmol/L    CO2 37 (H) 21 - 32 mmol/L    Anion gap 2 (L) 5 - 15 mmol/L    Glucose 117 (H) 65 - 100 mg/dL    BUN 16 6 - 20 MG/DL    Creatinine 0.92 0.70 - 1.30 MG/DL    BUN/Creatinine ratio 17 12 - 20      GFR est AA >60 >60 ml/min/1.73m2    GFR est non-AA >60 >60 ml/min/1.73m2    Calcium 9.1 8.5 - 10.1 MG/DL    Bilirubin, total 0.8 0.2 - 1.0 MG/DL    ALT (SGPT) 66 12 - 78 U/L    AST (SGOT) 22 15 - 37 U/L    Alk.  phosphatase 70 45 - 117 U/L    Protein, total 7.3 6.4 - 8.2 g/dL    Albumin 3.5 3.5 - 5.0 g/dL    Globulin 3.8 2.0 - 4.0 g/dL    A-G Ratio 0.9 (L) 1.1 - 2.2     CK W/ REFLX CKMB    Collection Time: 10/29/21  9:43 AM   Result Value Ref Range     39 - 308 U/L   TROPONIN-HIGH SENSITIVITY    Collection Time: 10/29/21  9:43 AM   Result Value Ref Range    Troponin-High Sensitivity 53 0 - 76 ng/L   NT-PRO BNP    Collection Time: 10/29/21  9:43 AM   Result Value Ref Range    NT pro- (H) <125 PG/ML   TROPONIN-HIGH SENSITIVITY    Collection Time: 10/29/21 12:18 PM   Result Value Ref Range    Troponin-High Sensitivity 42 0 - 76 ng/L       Radiologic Studies  CTA CHEST W OR W WO CONT   Final Result   There is no pulmonary embolism. There is no aortic aneurysm or dissection. Imaging findings consistent with pulmonary arterial hypertension. Hepatic steatosis with nodular liver contour consistent with cirrhotic change. XR CHEST PORT   Final Result   No acute cardiopulmonary process. US ABD LTD    (Results Pending)     CT Results  (Last 48 hours)               10/29/21 1250  CTA CHEST W OR W WO CONT Final result    Impression:  There is no pulmonary embolism. There is no aortic aneurysm or dissection. Imaging findings consistent with pulmonary arterial hypertension. Hepatic steatosis with nodular liver contour consistent with cirrhotic change. Narrative:  Clinical history: hypoxic respiratory failure, morbid obesity. Pulm   hypertension? INDICATION:   hypoxic respiratory failure, morbid obesity. Pulm hypertension? COMPARISON: 11/29/2019           CONTRAST: 100 ml Isovue 370   TECHNIQUE: CT of the chest with  IV contrast , Isovue-370 is performed. Axial   images from the thoracic inlet to the level of the upper abdomen are obtained. Manual post-processing of the images and coronal reformatting is also performed. CT dose reduction was achieved through use of a standardized protocol tailored   for this examination and automatic exposure control for dose modulation. Multiplanar reformatted imaging was performed. Sagittal and coronal reformatting. 3-D Postprocessing of imaging was performed. 3-D MIP reconstructed images were obtained in the coronal plane. FINDINGS:    There is no pulmonary embolism. There is no aortic aneurysm or dissection. Pulmonary arterial enlargement is consistent with pulmonary arterial   hypertension. Hepatic steatosis. Nodular contour.  There is no pleural or pericardial effusion. There is no mediastinal, axillary or hilar   lymphadenopathy. The aorta is normal in course and caliber. The proximal   pulmonary arteries are without evidence of filling defects. No lytic or blastic   lesions are identified. The remainder of the upper abdomen visualized is   unremarkable. CXR Results  (Last 48 hours)               10/29/21 0941  XR CHEST PORT Final result    Impression:  No acute cardiopulmonary process. Narrative:  EXAM:  XR CHEST PORT       INDICATION:   Shortness of breath       COMPARISON: None. FINDINGS: AP radiograph of the chest was obtained. No evidence of focal consolidation. No pleural effusion or pneumothorax. Borderline cardiomegaly. No acute osseous abnormalities. Billable Procedures   EKG    Date/Time: 10/29/2021 9:43 AM  Performed by: Alison Trinidad MD  Authorized by: Alison Trinidad MD     ECG reviewed by ED Physician in the absence of a cardiologist: yes    Interpretation:     Interpretation: non-specific    Rate:     ECG rate assessment: normal    Rhythm:     Rhythm: sinus rhythm    Ectopy:     Ectopy: none    QRS:     QRS axis:  Normal  ST segments:     ST segments:  Normal  T waves:     T waves: normal          Medical Decision Making     I reviewed the patient's most recent Emergency Dept notes and diagnostic tests in formulating my MDM on today's visit. Provider Notes (Medical Decision Making):   58-year-old male with severe hypertension and morbid obesity presenting with shortness of breath which is waxing and waning and today acute on chronic. Also bilateral lower extremity edema. No fevers or chest pain. Patient is morbidly obese. His lungs are clear bilaterally, no wheezing or rales by exam.  He is noted to be very hypertensive 202/129 and hypoxic with room air oxygen saturation right around 90%. He has 2+ lower extremity edema.     Suspect a combination of hypertensive urgency and pulmonary edema, possible CHF, possible pulm HTN. Also suspect the patient is very deconditioned. CBC, CMP, troponin, BNP and chest x-ray pending. Treat with nitroglycerin 1 inch paste and Lasix 60 mg IV now. Adal Burroughs MD  9:41 AM  10/29/2021     Patient's oxygenation did not significantly improve with nitroglycerin and Lasix. Remains 90% on 2 L nasal cannula. CTA was performed, negative for pulmonary embolism, but consistent with pulmonary hypertension. Patient was admitted for hypoxia requiring supplemental oxygen, needs echocardiogram and cardiology and/or pulmonary consult. No evidence of PE or myocardial infarction at this time. Consults:    Social History     Tobacco Use    Smoking status: Never Smoker   Substance Use Topics    Alcohol use:  Yes     Alcohol/week: 1.7 standard drinks     Types: 2 Cans of beer per week     Comment: occasionally    Drug use: Not on file       Medications Administered during ED course:  Medications   atenoloL (TENORMIN) tablet 25 mg (has no administration in time range)   atorvastatin (LIPITOR) tablet 10 mg (has no administration in time range)   pantoprazole (PROTONIX) tablet 40 mg (has no administration in time range)   sodium chloride (NS) flush 5-40 mL (has no administration in time range)   sodium chloride (NS) flush 5-40 mL (has no administration in time range)   acetaminophen (TYLENOL) tablet 650 mg (has no administration in time range)     Or   acetaminophen (TYLENOL) suppository 650 mg (has no administration in time range)   polyethylene glycol (MIRALAX) packet 17 g (has no administration in time range)   ondansetron (ZOFRAN ODT) tablet 4 mg (has no administration in time range)     Or   ondansetron (ZOFRAN) injection 4 mg (has no administration in time range)   losartan (COZAAR) tablet 100 mg (has no administration in time range)     And   amLODIPine (NORVASC) tablet 10 mg (has no administration in time range)     And hydroCHLOROthiazide (MICROZIDE) capsule 12.5 mg (has no administration in time range)   enoxaparin (LOVENOX) injection 40 mg (has no administration in time range)   nitroglycerin (NITROBID) 2 % ointment 1 Inch (1 Inch Topical Given 10/29/21 0949)   furosemide (LASIX) injection 60 mg (60 mg IntraVENous Given 10/29/21 0950)   iopamidoL (ISOVUE-370) 76 % injection 100 mL (94 mL IntraVENous Given 10/29/21 1250)          Prescriptions from today's ED visit:  Current Discharge Medication List         Diagnosis and Disposition     Disposition:  Admitted    Clinical Impression:   1. Acute hypoxemic respiratory failure (HCC)    2. Morbid obesity ([address]    3. Hypertensive urgency        Attestation:  I personally performed the services described in this documentation on this date 10/29/2021 for patient Roshni Mckeon. Laura Marquez MD        I was the first provider for this patient on this visit. To the best of my ability I reviewed relevant prior medical records, electrocardiograms, laboratories, and radiologic studies. The patient's presenting problems were discussed, and the patient was in agreement with the care plan formulated and outlined with them. Laura Marquez MD    Please note that this dictation was completed with Dragon voice recognition software. Quite often unanticipated grammatical, syntax, homophones, and other interpretive errors are inadvertently transcribed by the computer software. Please disregard these errors and excuse any errors that have escaped final proofreading.

## 2021-10-29 NOTE — H&P
Hospitalist Admission Note    NAME: Mayank Benito   :  1965   MRN:  [id number]     Date/Time:  10/29/2021 3:06 PM    Patient PCP: Sangeeta Edwards MD  ________________________________________________________________________    My assessment of this patient's clinical condition and my plan of care is as follows. Assessment / Plan:  Shortness of breath likely multifactorial from pulmonary hypertension, morbid obesity anxiety undiagnosed obstructive sleep apnea/obesity hypoventilation syndrome  -CTA shows evidence of pulmonary hypertension but no evidence of infiltrate or pulmonary embolism  -She does have evidence of cirrhosis of the liver on CT so there could be component of interstitial edema as well  -Start Lasix 40 mg IV twice daily. Refer him to pulmonary on outpatient basis for pulmonary function testing.  -proBNP is only 159  -Troponi hypertensive urgency  ns x2 are normal  -He was counseled regarding weight loss    Polycythemia  -Does not report any smoking  -Repeat CBC in a.m. if CBC shows that hemoglobin is still elevated, get hematology and Deangelo 2 mutation     Hepatic steatosis  Possible cirrhosis  -CT shows evidence of hepatic steatosis with nodular liver  -Check ultrasound of liver  -Abdominal outpatient follow-up with GI for new onset of cirrhosis  -He admits to social drinking only. Transaminases and total bilirubin level are normal    Morbid obesity  Hypertensive urgency  -Continue atenolol, losartan, Norvasc, hydrochlorothiazide      Code Status: Full code  Surrogate Decision Maker: Wife Nba Lombardo    DVT Prophylaxis: Lovenox  GI Prophylaxis: not indicated    Baseline: From home, independent of ADLs        Subjective:   CHIEF COMPLAINT: Shortness of breath    HISTORY OF PRESENT ILLNESS:     Sarah Sandoval is a 61 y.o.   male who presents with past medical history of obesity, hypertension is coming the hospital chief complaint of shortness of breath.   Reports being in his usual different until about 3 months ago when he started noticing gradual onset of shortness of breath which is mostly with exertion. Reports generalized weakness along with some dizziness. Does not report much cough or phlegm. Does not report any chest pain. Denies palpitations or syncopal episodes. Denies abdominal pain, nausea or vomiting. On arrival to ED, he was noted to have a blood pressure of 202/129. He was also noted to be hypoxic with saturations of around 88% on room air. On labs hemoglobin was 16.7. BMP shows a creatinine of 0.92. LFTs are normal.  Troponin is 42.  proBNP is 159. CTA chest shows no evidence of pulmonary embolism but shows evidence of for pulmonary arterial hypertension along with some hepatic steatosis and also possibility of cirrhosis    We were asked to admit for work up and evaluation of the above problems. Past Medical History:   Diagnosis Date    Contusion, chest wall 2/12/2010    Hypertension     Muscle strain of chest wall 2/12/2010        Past Surgical History:   Procedure Laterality Date    COLONOSCOPY N/A 12/3/2019    COLONOSCOPY performed by Cortes Shaffer MD at [facility] ENDOSCOPY    HX CHOLECYSTECTOMY         Social History     Tobacco Use    Smoking status: Never Smoker   Substance Use Topics    Alcohol use: Yes     Alcohol/week: 1.7 standard drinks     Types: 2 Cans of beer per week     Comment: occasionally        Family History   Problem Relation Age of Onset    Diabetes Mother     Hypertension Father     Diabetes Father      No Known Allergies     Prior to Admission medications    Medication Sig Start Date End Date Taking? Authorizing Provider   atorvastatin (LIPITOR) 10 mg tablet Take 1 Tab by mouth nightly. 12/3/19   Luis F Arce, DEJON   omeprazole (PRILOSEC) 40 mg capsule Take 1 Cap by mouth daily. 12/3/19   Luis F Arce NP   atenolol (TENORMIN) 25 mg tablet Take 25 mg by mouth daily.     Provider, Historical   Olmesartan-Amlodipine-HCTZ (TRIBENZOR) 40-10-12.5 mg Tab Take  by mouth. Provider, Historical       REVIEW OF SYSTEMS:     I am not able to complete the review of systems because:    The patient is intubated and sedated    The patient has altered mental status due to his acute medical problems    The patient has baseline aphasia from prior stroke(s)    The patient has baseline dementia and is not reliable historian    The patient is in acute medical distress and unable to provide information           Total of 12 systems reviewed as follows:       POSITIVE= underlined text  Negative = text not underlined  General:  fever, chills, sweats, generalized weakness, weight loss/gain,      loss of appetite   Eyes:    blurred vision, eye pain, loss of vision, double vision  ENT:    rhinorrhea, pharyngitis   Respiratory:   cough, sputum production, SOB, COTE, wheezing, pleuritic pain   Cardiology:   chest pain, palpitations, orthopnea, PND, edema, syncope   Gastrointestinal:  abdominal pain , N/V, diarrhea, dysphagia, constipation, bleeding   Genitourinary:  frequency, urgency, dysuria, hematuria, incontinence   Muskuloskeletal :  arthralgia, myalgia, back pain  Hematology:  easy bruising, nose or gum bleeding, lymphadenopathy   Dermatological: rash, ulceration, pruritis, color change / jaundice  Endocrine:   hot flashes or polydipsia   Neurological:  headache, dizziness, confusion, focal weakness, paresthesia,     Speech difficulties, memory loss, gait difficulty  Psychological: Feelings of anxiety, depression, agitation    Objective:   VITALS:    Visit Vitals  BP (!) 196/116   Pulse 83   Temp 98 °F (36.7 °C)   Resp 23   Ht 5' 11\" (1.803 m)   Wt (!) 182.2 kg (401 lb 10.9 oz)   SpO2 92%   BMI 56.02 kg/m²       PHYSICAL EXAM:    General:    Alert, cooperative, no distress, appears stated age, obese     HEENT: Atraumatic, anicteric sclerae, pink conjunctivae     No oral ulcers, mucosa moist, throat clear, dentition fair  Neck:  Supple, symmetrical, thyroid: non tender  Lungs:   Air entry is diminished, no wheezing or crackles  Chest wall:  No tenderness  No Accessory muscle use. Heart:   Regular  rhythm,  No  murmur   No edema  Abdomen:   Soft, non-tender. Not distended. Bowel sounds normal  Extremities: No cyanosis. No clubbing,      Skin turgor normal, Capillary refill normal, Radial dial pulse 2+  Skin:     Not pale. Not Jaundiced  No rashes   Psych:  Not anxious or agitated. Neurologic: EOMs intact. No facial asymmetry. No aphasia or slurred speech. Symmetrical strength, Sensation grossly intact. Alert and oriented X 4.     _______________________________________________________________________  Care Plan discussed with:    Comments   Patient y    Family      RN y    Care Manager                    Consultant:      _______________________________________________________________________  Expected  Disposition:   Home with Family y   HH/PT/OT/RN    SNF/LTC    ANGELICA    ________________________________________________________________________  TOTAL TIME: 61  Minutes    Critical Care Provided     Minutes non procedure based      Comments    y Reviewed previous records   >50% of visit spent in counseling and coordination of care y Discussion with patient and/or family and questions answered       ________________________________________________________________________  Signed: Laurel Goodpasture, MD    Procedures: see electronic medical records for all procedures/Xrays and details which were not copied into this note but were reviewed prior to creation of Plan.     LAB DATA REVIEWED:    Recent Results (from the past 24 hour(s))   EKG, 12 LEAD, INITIAL    Collection Time: 10/29/21  9:07 AM   Result Value Ref Range    Ventricular Rate 92 BPM    Atrial Rate 92 BPM    P-R Interval 152 ms    QRS Duration 96 ms    Q-T Interval 360 ms    QTC Calculation (Bezet) 445 ms    Calculated P Axis 55 degrees    Calculated R Axis 2 degrees    Calculated T Axis 49 degrees Diagnosis       Normal sinus rhythm  Normal ECG  No previous ECGs available  Confirmed by Marimar Palma ([id number]) on 10/29/2021 1:02:51 PM     CBC WITH AUTOMATED DIFF    Collection Time: 10/29/21  9:43 AM   Result Value Ref Range    WBC 8.1 4.1 - 11.1 K/uL    RBC 6.02 (H) 4.10 - 5.70 M/uL    HGB 16.7 12.1 - 17.0 g/dL    HCT 55.2 (H) 36.6 - 50.3 %    MCV 91.7 80.0 - 99.0 FL    MCH 27.7 26.0 - 34.0 PG    MCHC 30.3 30.0 - 36.5 g/dL    RDW 14.2 11.5 - 14.5 %    PLATELET 924 272 - 018 K/uL    MPV 10.1 8.9 - 12.9 FL    NRBC 0.0 0  WBC    ABSOLUTE NRBC 0.00 0.00 - 0.01 K/uL    NEUTROPHILS 88 (H) 32 - 75 %    LYMPHOCYTES 5 (L) 12 - 49 %    MONOCYTES 7 5 - 13 %    EOSINOPHILS 0 0 - 7 %    BASOPHILS 0 0 - 1 %    IMMATURE GRANULOCYTES 0 0.0 - 0.5 %    ABS. NEUTROPHILS 7.1 1.8 - 8.0 K/UL    ABS. LYMPHOCYTES 0.4 (L) 0.8 - 3.5 K/UL    ABS. MONOCYTES 0.6 0.0 - 1.0 K/UL    ABS. EOSINOPHILS 0.0 0.0 - 0.4 K/UL    ABS. BASOPHILS 0.0 0.0 - 0.1 K/UL    ABS. IMM. GRANS. 0.0 0.00 - 0.04 K/UL    DF SMEAR SCANNED      RBC COMMENTS NORMOCYTIC, NORMOCHROMIC     METABOLIC PANEL, COMPREHENSIVE    Collection Time: 10/29/21  9:43 AM   Result Value Ref Range    Sodium 139 136 - 145 mmol/L    Potassium 3.8 3.5 - 5.1 mmol/L    Chloride 100 97 - 108 mmol/L    CO2 37 (H) 21 - 32 mmol/L    Anion gap 2 (L) 5 - 15 mmol/L    Glucose 117 (H) 65 - 100 mg/dL    BUN 16 6 - 20 MG/DL    Creatinine 0.92 0.70 - 1.30 MG/DL    BUN/Creatinine ratio 17 12 - 20      GFR est AA >60 >60 ml/min/1.73m2    GFR est non-AA >60 >60 ml/min/1.73m2    Calcium 9.1 8.5 - 10.1 MG/DL    Bilirubin, total 0.8 0.2 - 1.0 MG/DL    ALT (SGPT) 66 12 - 78 U/L    AST (SGOT) 22 15 - 37 U/L    Alk.  phosphatase 70 45 - 117 U/L    Protein, total 7.3 6.4 - 8.2 g/dL    Albumin 3.5 3.5 - 5.0 g/dL    Globulin 3.8 2.0 - 4.0 g/dL    A-G Ratio 0.9 (L) 1.1 - 2.2     CK W/ REFLX CKMB    Collection Time: 10/29/21  9:43 AM   Result Value Ref Range     39 - 308 U/L   TROPONIN-HIGH SENSITIVITY Collection Time: 10/29/21  9:43 AM   Result Value Ref Range    Troponin-High Sensitivity 53 0 - 76 ng/L   NT-PRO BNP    Collection Time: 10/29/21  9:43 AM   Result Value Ref Range    NT pro- (H) <125 PG/ML   TROPONIN-HIGH SENSITIVITY    Collection Time: 10/29/21 12:18 PM   Result Value Ref Range    Troponin-High Sensitivity 42 0 - 76 ng/L

## 2021-10-29 NOTE — PROGRESS NOTES
Pt's B/P elevated, Damon Zimmerman, NP notified, waiting for response, will pass on to night nurse. Bedside and Verbal shift change report given to [address] [place] [address] (oncoming nurse) by Armida Boeck (offgoing nurse). Report included the following information SBAR, Kardex, Intake/Output, MAR, Accordion, Recent Results and Cardiac Rhythm NS.

## 2021-10-30 LAB
ANION GAP SERPL CALC-SCNC: 2 MMOL/L (ref 5–15)
BUN SERPL-MCNC: 16 MG/DL (ref 6–20)
BUN/CREAT SERPL: 18 (ref 12–20)
CALCIUM SERPL-MCNC: 9.1 MG/DL (ref 8.5–10.1)
CHLORIDE SERPL-SCNC: 99 MMOL/L (ref 97–108)
CO2 SERPL-SCNC: 35 MMOL/L (ref 21–32)
CREAT SERPL-MCNC: 0.87 MG/DL (ref 0.7–1.3)
ERYTHROCYTE [DISTWIDTH] IN BLOOD BY AUTOMATED COUNT: 14.3 % (ref 11.5–14.5)
GLUCOSE SERPL-MCNC: 86 MG/DL (ref 65–100)
HCT VFR BLD AUTO: 51.6 % (ref 36.6–50.3)
HGB BLD-MCNC: 15.5 G/DL (ref 12.1–17)
MCH RBC QN AUTO: 27.5 PG (ref 26–34)
MCHC RBC AUTO-ENTMCNC: 30 G/DL (ref 30–36.5)
MCV RBC AUTO: 91.5 FL (ref 80–99)
NRBC # BLD: 0 K/UL (ref 0–0.01)
NRBC BLD-RTO: 0 PER 100 WBC
PLATELET # BLD AUTO: 207 K/UL (ref 150–400)
PMV BLD AUTO: 10.3 FL (ref 8.9–12.9)
POTASSIUM SERPL-SCNC: 3.6 MMOL/L (ref 3.5–5.1)
RBC # BLD AUTO: 5.64 M/UL (ref 4.1–5.7)
SODIUM SERPL-SCNC: 136 MMOL/L (ref 136–145)
WBC # BLD AUTO: 7.5 K/UL (ref 4.1–11.1)

## 2021-10-30 PROCEDURE — 74011250637 HC RX REV CODE- 250/637: Performed by: INTERNAL MEDICINE

## 2021-10-30 PROCEDURE — 74011000250 HC RX REV CODE- 250: Performed by: NURSE PRACTITIONER

## 2021-10-30 PROCEDURE — 77010033678 HC OXYGEN DAILY

## 2021-10-30 PROCEDURE — 74011250636 HC RX REV CODE- 250/636: Performed by: NURSE PRACTITIONER

## 2021-10-30 PROCEDURE — 94760 N-INVAS EAR/PLS OXIMETRY 1: CPT

## 2021-10-30 PROCEDURE — 85027 COMPLETE CBC AUTOMATED: CPT

## 2021-10-30 PROCEDURE — 65660000000 HC RM CCU STEPDOWN

## 2021-10-30 PROCEDURE — 36415 COLL VENOUS BLD VENIPUNCTURE: CPT

## 2021-10-30 PROCEDURE — 80048 BASIC METABOLIC PNL TOTAL CA: CPT

## 2021-10-30 PROCEDURE — 74011250636 HC RX REV CODE- 250/636: Performed by: INTERNAL MEDICINE

## 2021-10-30 RX ORDER — CLONIDINE HYDROCHLORIDE 0.1 MG/1
0.1 TABLET ORAL 2 TIMES DAILY
Status: DISCONTINUED | OUTPATIENT
Start: 2021-10-30 | End: 2021-10-31

## 2021-10-30 RX ADMIN — ACETAMINOPHEN 650 MG: 325 TABLET ORAL at 03:53

## 2021-10-30 RX ADMIN — FUROSEMIDE 40 MG: 10 INJECTION, SOLUTION INTRAMUSCULAR; INTRAVENOUS at 10:04

## 2021-10-30 RX ADMIN — ATORVASTATIN CALCIUM 10 MG: 10 TABLET, FILM COATED ORAL at 22:07

## 2021-10-30 RX ADMIN — CLONIDINE HYDROCHLORIDE 0.1 MG: 0.1 TABLET ORAL at 13:25

## 2021-10-30 RX ADMIN — LOSARTAN POTASSIUM 100 MG: 100 TABLET, FILM COATED ORAL at 10:04

## 2021-10-30 RX ADMIN — Medication 10 ML: at 22:07

## 2021-10-30 RX ADMIN — ATENOLOL 25 MG: 25 TABLET ORAL at 10:05

## 2021-10-30 RX ADMIN — FUROSEMIDE 40 MG: 10 INJECTION, SOLUTION INTRAMUSCULAR; INTRAVENOUS at 18:21

## 2021-10-30 RX ADMIN — LABETALOL HYDROCHLORIDE 10 MG: 5 INJECTION, SOLUTION INTRAVENOUS at 01:37

## 2021-10-30 RX ADMIN — ENOXAPARIN SODIUM 40 MG: 100 INJECTION SUBCUTANEOUS at 10:05

## 2021-10-30 RX ADMIN — Medication 10 ML: at 13:26

## 2021-10-30 RX ADMIN — PANTOPRAZOLE SODIUM 40 MG: 40 TABLET, DELAYED RELEASE ORAL at 10:06

## 2021-10-30 RX ADMIN — AMLODIPINE BESYLATE 10 MG: 5 TABLET ORAL at 10:04

## 2021-10-30 RX ADMIN — CLONIDINE HYDROCHLORIDE 0.1 MG: 0.1 TABLET ORAL at 18:21

## 2021-10-30 RX ADMIN — ENOXAPARIN SODIUM 40 MG: 100 INJECTION SUBCUTANEOUS at 22:07

## 2021-10-30 RX ADMIN — LABETALOL HYDROCHLORIDE 10 MG: 5 INJECTION, SOLUTION INTRAVENOUS at 22:09

## 2021-10-30 RX ADMIN — Medication 10 ML: at 07:43

## 2021-10-30 RX ADMIN — HYDRALAZINE HYDROCHLORIDE 10 MG: 20 INJECTION INTRAMUSCULAR; INTRAVENOUS at 18:24

## 2021-10-30 RX ADMIN — HYDROCHLOROTHIAZIDE 12.5 MG: 12.5 CAPSULE ORAL at 10:05

## 2021-10-30 NOTE — PROGRESS NOTES
Bedside shift change report given to David Delaney RN (oncoming nurse) by Suhas Glaser RN (offgoing nurse). Report included the following information SBAR, Kardex, Procedure Summary, Intake/Output, MAR, Accordion and Cardiac Rhythm NSR.

## 2021-10-30 NOTE — PROGRESS NOTES
BP remains elevated, PRN hydralazine and labetalol given. Bedside and Verbal shift change report given to [address] [place] [address] (oncoming nurse) by Escobar Salinas RN (offgoing nurse). Report included the following information SBAR, Kardex, Intake/Output, MAR, Accordion, Recent Results and Med Rec Status.

## 2021-10-30 NOTE — PROGRESS NOTES
Problem: Hypertension  Goal: *Blood pressure within specified parameters  Outcome: Progressing Towards Goal  Goal: *Fluid volume balance  Outcome: Progressing Towards Goal  Goal: *Labs within defined limits  Outcome: Progressing Towards Goal     Problem: Patient Education: Go to Patient Education Activity  Goal: Patient/Family Education  Outcome: Progressing Towards Goal     Problem: Tissue Perfusion - Cardiopulmonary, Altered  Goal: *Optimize tissue perfusion  Outcome: Progressing Towards Goal  Goal: *Absence of hypoxia  Outcome: Progressing Towards Goal     Problem: Patient Education: Go to Patient Education Activity  Goal: Patient/Family Education  Outcome: Progressing Towards Goal

## 2021-10-30 NOTE — CONSULTS
Pulmonary, Critical Care, and Sleep Medicine    Initial Patient Consult    Name: Britney Pan MRN: [id number]   : 1965 Hospital: [facility]   Date: 10/30/2021        IMPRESSION:   · FLORIN has been evaluated by DR. Cain Orr in our office. Has follow up already Scheduled. · Hypertensive Urgency: Poorly compliant with BP meds. · Hypoxia, increased Hgb. · Dyspnea: Agree multifactorial. Severe Obesity, Volume overload. · ?liver disease. Will Need Outpt GI or Liver eval. Hepatic Steatosis. · Polycythemia  · Possible Pulmonary Htn, Echo is pending  ·   · Will follow oxygen needs      RECOMMENDATIONS:   · Needs follow up with sleep lab  · Agree needs outpt PFTs  · Will Get ABG in am.   · Will folllow up on Echo. · On Diuretics. Subjective:      chief complaint: shortness of breath    This patient has been seen and evaluated at the request of Dr. Javier Garcia for above. Patient is a 61 y.o. male  Who was on his way to work; He felt like his breathing was not normal and overall he was not in a good position to be able to drive. So he presented to the Emergency Room  for evaluation. He works as a  for [employer] and typically does 3-13 hour shifts per week. He has not been able to work for the last several weeks due to some difficulty with his breathing. He reports that he has not been able to sleep very well for almost 4 weeks. We will wake up at night with shortness of breath. Sleep in his recliner and then go back into bed. Has orthopnea. He has not been compliant with his meds. He has had about one month of bilateral extremity leg swelling. When he bends over to put on his shoes he has moderate to severe dyspnea. When he gets up into his truck seat he will have mild dyspnea that usually improves as he sits for a couple minutes. With any exertion he has mild to moderate dyspnea. He's had intermittent wheezing. Denies any reflux.    He does have some recurrent dysphagia which has been happening for several months. Has mild to moderate sinus congestion. Reviewed records from 10/21/2021   PAR sleep medicine;  TeleMed visit. Patient had a history of sleep apnea was last studied on January 2012. At that time he was noted to have moderate sleep apnea with more severe sleep apnea in rem sleep overall his AHI was 16.3 with a REM index of 58.5. He had been recommended for CPAP at 12 cm. He has been off of his CPAP for a number of years because of intolerance of the CPAP. His weight has continued to increase and he is developed more peripheral edema. He was noted to have an Yoder score of 11. He has been followed by Dr. Johnna Lou. Followed by Dr. Narciso Borrego. he denies any known drug allergies     denies any tobacco use     has occasional alcohol. he is  and has 2 kids  works for [employer] as a       Past Medical History:   Diagnosis Date    Contusion, chest wall 2/12/2010    Hypertension     Muscle strain of chest wall 2/12/2010      Past Surgical History:   Procedure Laterality Date    COLONOSCOPY N/A 12/3/2019    COL[facility] Abi [facility]cedo [facility] at [facility] ENDOSCOPY    HX CHOLECYSTECTOMY        Prior to Admission medications    Medication Sig Start Date End Date Taking? Authorizing Provider   levothyroxine (SYNTHROID) 50 mcg tablet Take 50 mcg by mouth Daily (before breakfast). Indications: a condition with low thyroid hormone levels   Yes Provider, Historical   predniSONE (DELTASONE) 10 mg tablet Take 10 mg by mouth daily. 10/21/21 11/2/21 Yes Provider, Historical   carvediloL (COREG) 25 mg tablet Take 25 mg by mouth two (2) times a day. Indications: high blood pressure   Yes Provider, Historical   hydroCHLOROthiazide (HYDRODIURIL) 25 mg tablet Take 25 mg by mouth daily.  Indications: visible water retention, high blood pressure   Yes Provider, Historical   hydrALAZINE (APRESOLINE) 50 mg tablet Take 50 mg by mouth two (2) times a day. Indications: high blood pressure   Yes Provider, Historical   spironolactone (ALDACTONE) 50 mg tablet Take 50 mg by mouth daily. Indications: high blood pressure   Yes Provider, Historical   budesonide-formoteroL (Symbicort) 160-4.5 mcg/actuation HFAA Take 2 Puffs by inhalation two (2) times a day. Indications: controller medication for asthma   Yes Provider, Historical   furosemide (LASIX) 20 mg tablet Take 20 mg by mouth daily. Indications: visible water retention, high blood pressure   Yes Provider, Historical   atorvastatin (LIPITOR) 10 mg tablet Take 1 Tab by mouth nightly. 12/3/19  Yes Luis F Arce NP   omeprazole (PRILOSEC) 40 mg capsule Take 1 Cap by mouth daily. Patient not taking: Reported on 10/29/2021 12/3/19   Luis F Arce NP   atenolol (TENORMIN) 25 mg tablet Take 25 mg by mouth daily. Patient not taking: Reported on 10/29/2021    Provider, Historical   Olmesartan-Amlodipine-HCTZ (TRIBENZOR) 40-10-12.5 mg Tab Take  by mouth. Patient not taking: Reported on 10/29/2021    Provider, Historical     No Known Allergies   Social History     Tobacco Use    Smoking status: Never Smoker   Substance Use Topics    Alcohol use:  Yes     Alcohol/week: 1.7 standard drinks     Types: 2 Cans of beer per week     Comment: occasionally      Family History   Problem Relation Age of Onset    Diabetes Mother     Hypertension Father     Diabetes Father         Current Facility-Administered Medications   Medication Dose Route Frequency    cloNIDine HCL (CATAPRES) tablet 0.1 mg  0.1 mg Oral BID    atenoloL (TENORMIN) tablet 25 mg  25 mg Oral DAILY    atorvastatin (LIPITOR) tablet 10 mg  10 mg Oral QHS    pantoprazole (PROTONIX) tablet 40 mg  40 mg Oral ACB    sodium chloride (NS) flush 5-40 mL  5-40 mL IntraVENous Q8H    losartan (COZAAR) tablet 100 mg  100 mg Oral DAILY    And    amLODIPine (NORVASC) tablet 10 mg  10 mg Oral DAILY    And    hydroCHLOROthiazide (MICROZIDE) capsule 12.5 mg  12.5 mg Oral DAILY    enoxaparin (LOVENOX) injection 40 mg  40 mg SubCUTAneous Q12H    furosemide (LASIX) injection 40 mg  40 mg IntraVENous BID    influenza vaccine  (6 mos+)(PF) (FLUARIX/FLULAVAL/FLUZONE QUAD) injection 0.5 mL  1 Each IntraMUSCular PRIOR TO DISCHARGE       Review of Systems:  Constitutional: positive for fatigue  Eyes: negative  Ears, nose, mouth, throat, and face: positive for nasal congestion  Respiratory: positive for cough, wheezing or dyspnea on exertion  Cardiovascular: positive for chest pressure/discomfort, dyspnea, fatigue, orthopnea, paroxysmal nocturnal dyspnea, exertional chest pressure/discomfort, lower extremity edema, tachypnea, dyspnea on exertion  Gastrointestinal: positive for dysphagia  Genitourinary:negative  Integument/breast: negative  Hematologic/lymphatic: negative  Musculoskeletal:negative  Neurological: negative  Behavioral/Psych: negative  Endocrine: negative  Allergic/Immunologic: negative    Objective:   Vital Signs:    Visit Vitals  BP (!) 188/120 (BP 1 Location: Right lower arm, BP Patient Position: Sitting)   Pulse 73   Temp 98.8 °F (37.1 °C)   Resp 16   Ht 5' 11\" (1.803 m)   Wt (!) 182.2 kg (401 lb 10.9 oz)   SpO2 94%   BMI 56.02 kg/m²       O2 Device: Nasal cannula   O2 Flow Rate (L/min): 2 l/min   Temp (24hrs), Av.4 °F (36.9 °C), Min:98 °F (36.7 °C), Max:98.8 °F (37.1 °C)       Intake/Output:   Last shift:      10/30 0701 - 10/30 1900  In: -   Out: 325 [Urine:325]  Last 3 shifts: 10/28 1901 - 10/30 0700  In: -   Out: 1600 [Urine:1600]    Intake/Output Summary (Last 24 hours) at 10/30/2021 1221  Last data filed at 10/30/2021 1106  Gross per 24 hour   Intake --   Output 325 ml   Net -325 ml      Physical Exam:   General:  Alert, cooperative, no distress, appears stated age. Sitting up in chair. NO distress. On NC oxygen. Head:  Normocephalic, without obvious abnormality, atraumatic.    Eyes: Conjunctivae/corneas clear. PERRL, EOMs intact. Nose: Nares normal. Septum midline. Mucosa normal. No drainage or sinus tenderness. Throat: Lips, mucosa, and tongue normal. Teeth and gums normal.   Neck: Supple, symmetrical, trachea midline, no adenopathy, thyroid: no enlargment/tenderness/nodules, no carotid bruit and no JVD. Back:   Symmetric, no curvature. ROM normal.   Lungs:   Clear to auscultation bilaterally. Some decreased BS in bases. Chest wall:  No tenderness or deformity. Heart:  Regular rate and rhythm, S1, S2 normal, no murmur, click, rub or gallop. Abdomen:   Soft, non-tender. Bowel sounds normal. No masses,  No organomegaly. Obese. Extremities: Extremities normal, atraumatic, no cyanosis, has 1+ BLE edema. Pulses: 2+ and symmetric all extremities. Skin: Skin color, texture, turgor normal. No rashes or lesions   Lymph nodes: Cervical, supraclavicular nodes normal.   Neurologic: Grossly nonfocal, motor is intact. Data review:     Recent Results (from the past 24 hour(s))   METABOLIC PANEL, BASIC    Collection Time: 10/30/21  3:54 AM   Result Value Ref Range    Sodium 136 136 - 145 mmol/L    Potassium 3.6 3.5 - 5.1 mmol/L    Chloride 99 97 - 108 mmol/L    CO2 35 (H) 21 - 32 mmol/L    Anion gap 2 (L) 5 - 15 mmol/L    Glucose 86 65 - 100 mg/dL    BUN 16 6 - 20 MG/DL    Creatinine 0.87 0.70 - 1.30 MG/DL    BUN/Creatinine ratio 18 12 - 20      GFR est AA >60 >60 ml/min/1.73m2    GFR est non-AA >60 >60 ml/min/1.73m2    Calcium 9.1 8.5 - 10.1 MG/DL   CBC W/O DIFF    Collection Time: 10/30/21  3:54 AM   Result Value Ref Range    WBC 7.5 4.1 - 11.1 K/uL    RBC 5.64 4.10 - 5.70 M/uL    HGB 15.5 12.1 - 17.0 g/dL    HCT 51.6 (H) 36.6 - 50.3 %    MCV 91.5 80.0 - 99.0 FL    MCH 27.5 26.0 - 34.0 PG    MCHC 30.0 30.0 - 36.5 g/dL    RDW 14.3 11.5 - 14.5 %    PLATELET 547 247 - 842 K/uL    MPV 10.3 8.9 - 12.9 FL    NRBC 0.0 0  WBC    ABSOLUTE NRBC 0.00 0.00 - 0.01 K/uL       Imaging:   I have personally reviewed the patients radiographs and have reviewed the reports:  10-29-21: CXR is clear.      10-29-21: EKG is normal.         Cornel Gupta MD

## 2021-10-30 NOTE — PROGRESS NOTES
Hospitalist Progress Note    NAME: Lane Frausto   :  1965   MRN:  [id number]       Assessment / Plan:  Shortness of breath likely multifactorial from pulmonary hypertension, morbid obesity anxiety undiagnosed obstructive sleep apnea/obesity hypoventilation syndrome  Hypertensive urgency  -CTA shows evidence of pulmonary hypertension but no evidence of infiltrate or pulmonary embolism  -She does have evidence of cirrhosis of the liver on CT so there could be component of interstitial edema as well  C/w  Lasix 40 mg IV twice daily. Patient reported some improvement as now he is able to lay flat. Pulmonology consulted  Check ABG tomorrow a.m.  -proBNP is only 159  -Continue with home BP meds, systolic blood pressure still in the 180s, added clonidine-He was counseled regarding weight loss  We will check 2D echo  Will assess ambulatory pulse ox    Polycythemia  RBC on admission was 6.02, trending down  -Does not report any smoking     Hepatic steatosis  Possible cirrhosis  -CT shows evidence of hepatic steatosis with nodular liver  Ultrasound of the liver showed hepatic steatosis  -Abdominal outpatient follow-up with GI for new onset of cirrhosis  -He admits to social drinking only. Transaminases and total bilirubin level are normal     Morbid obesity  Hypertensive urgency  -Continue atenolol, losartan, Norvasc, hydrochlorothiazide        Code Status: Full code  Surrogate Decision Maker: Wife Chitra Null     DVT Prophylaxis: Lovenox  GI Prophylaxis: not indicated     Baseline: From home, independent of ADLs, is a       40 or above Morbid obesity / Body mass index is 56.02 kg/m².     Estimated discharge date:   Barriers: Clinical improvement    Code status: Full  Prophylaxis: Lovenox  Recommended Disposition: Home w/Family     Subjective:     Chief Complaint / Reason for Physician Visit  Follow-up shortness of breath.patient reported feeling better, was orthopneic prior to admission but reported that he can now lay flat . he is eager to go home. Discussed with RN events overnight. Review of Systems:  Symptom Y/N Comments  Symptom Y/N Comments   Fever/Chills    Chest Pain n    Poor Appetite    Edema y    Cough    Abdominal Pain n    Sputum    Joint Pain     SOB/COTE y   Pruritis/Rash     Nausea/vomit    Tolerating PT/OT     Diarrhea    Tolerating Diet y    Constipation    Other       Could NOT obtain due to:      Objective:     VITALS:   Last 24hrs VS reviewed since prior progress note. Most recent are:  Patient Vitals for the past 24 hrs:   Temp Pulse Resp BP SpO2   10/30/21 0801 98 °F (36.7 °C) 86 18 (!) 189/109 94 %   10/30/21 0400 -- -- 18 (!) 169/100 96 %   10/30/21 0123 98.6 °F (37 °C) 92 17 (!) 188/121 94 %   10/29/21 2057 98.5 °F (36.9 °C) 86 18 (!) 186/111 96 %   10/29/21 1500 98 °F (36.7 °C) 83 17 (!) 179/120 94 %   10/29/21 1330 -- 83 23 (!) 196/116 92 %   10/29/21 1308 -- 85 21 -- 92 %   10/29/21 1300 -- -- -- (!) 191/119 --   10/29/21 1130 -- 85 25 (!) 171/127 92 %   10/29/21 1100 -- 89 18 (!) 195/120 91 %   10/29/21 1033 -- -- -- -- 91 %   10/29/21 1030 -- 87 (!) 31 (!) 201/128 (!) 88 %   10/29/21 0942 -- 93 -- (!) 213/147 90 %   10/29/21 0900 98 °F (36.7 °C) 95 18 (!) 202/129 (!) 88 %       Intake/Output Summary (Last 24 hours) at 10/30/2021 0842  Last data filed at 10/29/2021 1214  Gross per 24 hour   Intake --   Output 1600 ml   Net -1600 ml        I had a face to face encounter and independently examined this patient on 10/30/2021, as outlined below:  PHYSICAL EXAM:  General: WD, WN. Alert, cooperative, no acute distress    EENT:  EOMI. Anicteric sclerae. MMM  Resp:  CTA bilaterally, no wheezing or rales. No accessory muscle use  CV:  Regular  rhythm,  No edema  GI:  Soft, Non distended, Non tender. +Bowel sounds  Neurologic:  Alert and oriented X 3, normal speech,   Psych:   Good insight. Not anxious nor agitated  Skin:  No rashes.   No jaundice    Reviewed most current lab test results and cultures  YES  Reviewed most current radiology test results   YES  Review and summation of old records today    NO  Reviewed patient's current orders and MAR    YES  PMH/SH reviewed - no change compared to H&P  ________________________________________________________________________  Care Plan discussed with:    Comments   Patient x    Family      RN x    Care Manager     Consultant                        Multidiciplinary team rounds were held today with , nursing, pharmacist and clinical coordinator. Patient's plan of care was discussed; medications were reviewed and discharge planning was addressed. ________________________________________________________________________  Total NON critical care TIME: 40Minutes    Total CRITICAL CARE TIME Spent:   Minutes non procedure based      Comments   >50% of visit spent in counseling and coordination of care     ________________________________________________________________________  Sarah Adams MD     Procedures: see electronic medical records for all procedures/Xrays and details which were not copied into this note but were reviewed prior to creation of Plan. LABS:  I reviewed today's most current labs and imaging studies.   Pertinent labs include:  Recent Labs     10/30/21  0354 10/29/21  0943   WBC 7.5 8.1   HGB 15.5 16.7   HCT 51.6* 55.2*    236     Recent Labs     10/30/21  0354 10/29/21  0943    139   K 3.6 3.8   CL 99 100   CO2 35* 37*   GLU 86 117*   BUN 16 16   CREA 0.87 0.92   CA 9.1 9.1   ALB  --  3.5   TBILI  --  0.8   ALT  --  66       Signed: Sarah Adams MD

## 2021-10-30 NOTE — PROGRESS NOTES
Verified with pt which pharmacy he is currently using, spoke with Karolyn Concepcion to obtain prescribed med list, pt reports that he has not been taking the medications as frequently as prescribed.

## 2021-10-31 ENCOUNTER — APPOINTMENT (OUTPATIENT)
Dept: NON INVASIVE DIAGNOSTICS | Age: 56
DRG: 291 | End: 2021-10-31
Attending: INTERNAL MEDICINE
Payer: COMMERCIAL

## 2021-10-31 LAB
ANION GAP SERPL CALC-SCNC: 3 MMOL/L (ref 5–15)
ARTERIAL PATENCY WRIST A: POSITIVE
BASE EXCESS BLD CALC-SCNC: 10.2 MMOL/L
BDY SITE: ABNORMAL
BUN SERPL-MCNC: 15 MG/DL (ref 6–20)
BUN/CREAT SERPL: 19 (ref 12–20)
CALCIUM SERPL-MCNC: 9.5 MG/DL (ref 8.5–10.1)
CHLORIDE SERPL-SCNC: 95 MMOL/L (ref 97–108)
CO2 SERPL-SCNC: 38 MMOL/L (ref 21–32)
CREAT SERPL-MCNC: 0.79 MG/DL (ref 0.7–1.3)
ECHO AV AREA PEAK VELOCITY: 2.51 CM2
ECHO AV AREA PEAK VELOCITY: 2.55 CM2
ECHO AV CUSP MM: 1.98 CM
ECHO AV PEAK GRADIENT: 8.21 MMHG
ECHO AV PEAK VELOCITY: 143.29 CM/S
ECHO LA AREA 4C: 24.23 CM2
ECHO LA TO AORTIC ROOT RATIO: 1.27
ECHO LA TO AORTIC ROOT RATIO: 1.27
ECHO LA VOL 2C: 95.01 ML (ref 18–58)
ECHO LA VOL 4C: 79.5 ML (ref 18–58)
ECHO LA VOL BP: 90.93 ML (ref 18–58)
ECHO LA VOL/BSA BIPLANE: 32.02 ML/M2 (ref 16–28)
ECHO LA VOLUME INDEX A2C: 33.45 ML/M2 (ref 16–28)
ECHO LA VOLUME INDEX A4C: 27.99 ML/M2 (ref 16–28)
ECHO LV E' LATERAL VELOCITY: 5.01 CM/S
ECHO LV E' SEPTAL VELOCITY: 6.48 CM/S
ECHO LV INTERNAL DIMENSION DIASTOLIC MMODE: 5.8 CM
ECHO LV INTERNAL DIMENSION SYSTOLIC MMODE: 4.07 CM
ECHO LV IVSD MMODE: 2.14 CM
ECHO LV IVSS MMODE: 2.85 CM
ECHO LV POSTERIOR WALL DIASTOLIC MMODE: 2.14 CM
ECHO LVOT DIAM: 2.13 CM
ECHO LVOT PEAK GRADIENT: 4.1 MMHG
ECHO LVOT PEAK GRADIENT: 4.21 MMHG
ECHO LVOT PEAK VELOCITY: 101.21 CM/S
ECHO LVOT PEAK VELOCITY: 102.65 CM/S
ECHO LVOT SV: 58.9 ML
ECHO LVOT VTI: 16.56 CM
ECHO MV A VELOCITY: 86.62 CM/S
ECHO MV E DECELERATION TIME (DT): 206.42 MS
ECHO MV E VELOCITY: 73.62 CM/S
ECHO MV E/A RATIO: 0.85
ECHO MV E/E' LATERAL: 14.69
ECHO MV E/E' RATIO (AVERAGED): 13.03
ECHO MV E/E' SEPTAL: 11.36
ECHO PV MAX VELOCITY: 74.63 CM/S
ECHO PV PEAK INSTANTANEOUS GRADIENT SYSTOLIC: 2.23 MMHG
GAS FLOW.O2 O2 DELIVERY SYS: ABNORMAL L/MIN
GLUCOSE SERPL-MCNC: 89 MG/DL (ref 65–100)
HCO3 BLD-SCNC: 40.4 MMOL/L (ref 22–26)
LVOT MG: 1.8 MMHG
O2/TOTAL GAS SETTING VFR VENT: 2 %
PCO2 BLD: 73.1 MMHG (ref 35–45)
PH BLD: 7.35 [PH] (ref 7.35–7.45)
PO2 BLD: 73 MMHG (ref 80–100)
POTASSIUM SERPL-SCNC: 3.3 MMOL/L (ref 3.5–5.1)
SAO2 % BLD: 92.8 % (ref 92–97)
SODIUM SERPL-SCNC: 136 MMOL/L (ref 136–145)
SPECIMEN TYPE: ABNORMAL

## 2021-10-31 PROCEDURE — 74011250636 HC RX REV CODE- 250/636: Performed by: INTERNAL MEDICINE

## 2021-10-31 PROCEDURE — 94760 N-INVAS EAR/PLS OXIMETRY 1: CPT

## 2021-10-31 PROCEDURE — C8929 TTE W OR WO FOL WCON,DOPPLER: HCPCS

## 2021-10-31 PROCEDURE — 36415 COLL VENOUS BLD VENIPUNCTURE: CPT

## 2021-10-31 PROCEDURE — 82803 BLOOD GASES ANY COMBINATION: CPT

## 2021-10-31 PROCEDURE — 77010033678 HC OXYGEN DAILY

## 2021-10-31 PROCEDURE — 36600 WITHDRAWAL OF ARTERIAL BLOOD: CPT

## 2021-10-31 PROCEDURE — 74011250637 HC RX REV CODE- 250/637: Performed by: INTERNAL MEDICINE

## 2021-10-31 PROCEDURE — 80048 BASIC METABOLIC PNL TOTAL CA: CPT

## 2021-10-31 PROCEDURE — 65660000000 HC RM CCU STEPDOWN

## 2021-10-31 PROCEDURE — 74011250636 HC RX REV CODE- 250/636: Performed by: NURSE PRACTITIONER

## 2021-10-31 RX ORDER — CLONIDINE HYDROCHLORIDE 0.1 MG/1
0.2 TABLET ORAL 2 TIMES DAILY
Status: DISCONTINUED | OUTPATIENT
Start: 2021-10-31 | End: 2021-11-02 | Stop reason: HOSPADM

## 2021-10-31 RX ADMIN — ATORVASTATIN CALCIUM 10 MG: 10 TABLET, FILM COATED ORAL at 22:06

## 2021-10-31 RX ADMIN — PERFLUTREN 2 ML: 6.52 INJECTION, SUSPENSION INTRAVENOUS at 11:00

## 2021-10-31 RX ADMIN — FUROSEMIDE 40 MG: 10 INJECTION, SOLUTION INTRAMUSCULAR; INTRAVENOUS at 11:28

## 2021-10-31 RX ADMIN — PANTOPRAZOLE SODIUM 40 MG: 40 TABLET, DELAYED RELEASE ORAL at 11:17

## 2021-10-31 RX ADMIN — Medication 10 ML: at 23:56

## 2021-10-31 RX ADMIN — Medication 10 ML: at 06:27

## 2021-10-31 RX ADMIN — FUROSEMIDE 40 MG: 10 INJECTION, SOLUTION INTRAMUSCULAR; INTRAVENOUS at 18:21

## 2021-10-31 RX ADMIN — HYDROCHLOROTHIAZIDE 12.5 MG: 12.5 CAPSULE ORAL at 11:28

## 2021-10-31 RX ADMIN — ENOXAPARIN SODIUM 40 MG: 100 INJECTION SUBCUTANEOUS at 22:06

## 2021-10-31 RX ADMIN — CLONIDINE HYDROCHLORIDE 0.2 MG: 0.1 TABLET ORAL at 18:21

## 2021-10-31 RX ADMIN — CLONIDINE HYDROCHLORIDE 0.2 MG: 0.1 TABLET ORAL at 11:28

## 2021-10-31 RX ADMIN — Medication 10 ML: at 18:27

## 2021-10-31 RX ADMIN — ATENOLOL 25 MG: 25 TABLET ORAL at 11:25

## 2021-10-31 RX ADMIN — ENOXAPARIN SODIUM 40 MG: 100 INJECTION SUBCUTANEOUS at 11:29

## 2021-10-31 RX ADMIN — AMLODIPINE BESYLATE 10 MG: 5 TABLET ORAL at 11:25

## 2021-10-31 RX ADMIN — HYDRALAZINE HYDROCHLORIDE 10 MG: 20 INJECTION INTRAMUSCULAR; INTRAVENOUS at 03:09

## 2021-10-31 RX ADMIN — LOSARTAN POTASSIUM 100 MG: 100 TABLET, FILM COATED ORAL at 11:17

## 2021-10-31 NOTE — PROGRESS NOTES
Pulmonary, Critical Care, and Sleep Medicine    Patient Consult    Name: Ryan García MRN: [id number]   : 1965 Hospital: [facility]   Date: 10/31/2021        IMPRESSION:   · FLORIN has been evaluated by DR. Nelly Walter in our office. Has follow up already Scheduled. · Hypoxic and Hypercarbic Respiratory Failure:  increased Hgb. Still with hypoxia. Will try to wean as tolerated. Pt has increased co2 levels with pco2 of 73 and pao2 of 73 on 2L NC.   · Hypertensive Urgency: Poorly compliant with BP meds. STill with elevated blood pressures. · Dyspnea: Agree multifactorial. Severe Obesity, Volume overload. · ?liver disease. Will Need Outpt GI or Liver eval. Hepatic Steatosis. · Polycythemia  · Possible Pulmonary Htn, Echo is pending  · Will follow oxygen needs      RECOMMENDATIONS:   · Needs follow up with sleep lab, has appt with Dr. Nelly Walter. · Can try to get bedside PFTs in am.   · Assess for oxygen needs. · Will folllow up on Echo. · On Diuretics, he feels like his edema is improving. Subjective:   Last 24 hrs: Overall Mr. Phillip Gambino is feeling better. Still has some dyspnea. Still has lower extremity edema but is better. He feels like his sleeping was better last night. He is still requiring submental oxygen to keep his pulse oximetry over 90%. He asked if we could do sleep medicine evaluation in the hospital, explained to him it would need to be done as an outpatient. He has a dry cough. Explained to him that the systemic hypertension causes him to have some stress on his heart and causing him to have some fluid in his lungs which is improving with diuretics. Overall he feels like he is improving explained him we need to do some additional testing and evaluation. He is agreeable to proceed. Initial   chief complaint: shortness of breath    This patient has been seen and evaluated at the request of Dr. Dom Longo for above. Patient is a 61 y.o. male  Who was on his way to work; He felt like his breathing was not normal and overall he was not in a good position to be able to drive. So he presented to the Emergency Room  for evaluation. He works as a  for [employer] and typically does 3-13 hour shifts per week. He has not been able to work for the last several weeks due to some difficulty with his breathing. He reports that he has not been able to sleep very well for almost 4 weeks. We will wake up at night with shortness of breath. Sleep in his recliner and then go back into bed. Has orthopnea. He has not been compliant with his meds. He has had about one month of bilateral extremity leg swelling. When he bends over to put on his shoes he has moderate to severe dyspnea. When he gets up into his truck seat he will have mild dyspnea that usually improves as he sits for a couple minutes. With any exertion he has mild to moderate dyspnea. He's had intermittent wheezing. Denies any reflux. He does have some recurrent dysphagia which has been happening for several months. Has mild to moderate sinus congestion. Reviewed records from 10/21/2021   [facility] sleep medicine;  TeleMed visit. Patient had a history of sleep apnea was last studied on January 2012. At that time he was noted to have moderate sleep apnea with more severe sleep apnea in rem sleep overall his AHI was 16.3 with a REM index of 58.5. He had been recommended for CPAP at 12 cm. He has been off of his CPAP for a number of years because of intolerance of the CPAP. His weight has continued to increase and he is developed more peripheral edema. He was noted to have an [place] score of 11. He has been followed by Dr. Ariane Marcos. Followed by Dr. Chucho Swanson. he denies any known drug allergies     denies any tobacco use     has occasional alcohol.      he is  and has 2 kids  works for [employer] as a       Past Medical History:   Diagnosis Date    Contusion, chest wall 2/12/2010    Hypertension     Muscle strain of chest wall 2/12/2010      Past Surgical History:   Procedure Laterality Date    COLONOSCOPY N/A 12/3/2019    COLONOSCOPY performed by Deangelo Martino MD at [facility] ENDOSCOPY    HX CHOLECYSTECTOMY        Prior to Admission medications    Medication Sig Start Date End Date Taking? Authorizing Provider   levothyroxine (SYNTHROID) 50 mcg tablet Take 50 mcg by mouth Daily (before breakfast). Indications: a condition with low thyroid hormone levels   Yes Provider, Historical   predniSONE (DELTASONE) 10 mg tablet Take 10 mg by mouth daily. 10/21/21 11/2/21 Yes Provider, Historical   carvediloL (COREG) 25 mg tablet Take 25 mg by mouth two (2) times a day. Indications: high blood pressure   Yes Provider, Historical   hydroCHLOROthiazide (HYDRODIURIL) 25 mg tablet Take 25 mg by mouth daily. Indications: visible water retention, high blood pressure   Yes Provider, Historical   hydrALAZINE (APRESOLINE) 50 mg tablet Take 50 mg by mouth two (2) times a day. Indications: high blood pressure   Yes Provider, Historical   spironolactone (ALDACTONE) 50 mg tablet Take 50 mg by mouth daily. Indications: high blood pressure   Yes Provider, Historical   budesonide-formoteroL (Symbicort) 160-4.5 mcg/actuation HFAA Take 2 Puffs by inhalation two (2) times a day. Indications: controller medication for asthma   Yes Provider, Historical   furosemide (LASIX) 20 mg tablet Take 20 mg by mouth daily. Indications: visible water retention, high blood pressure   Yes Provider, Historical   atorvastatin (LIPITOR) 10 mg tablet Take 1 Tab by mouth nightly. 12/3/19  Yes Luis F Arce NP   omeprazole (PRILOSEC) 40 mg capsule Take 1 Cap by mouth daily. Patient not taking: Reported on 10/29/2021 12/3/19   Luis F Arce NP   atenolol (TENORMIN) 25 mg tablet Take 25 mg by mouth daily.   Patient not taking: Reported on 10/29/2021    Provider, Historical   Olmesartan-Amlodipine-HCTZ (TRIBENZOR) 40-10-12.5 mg Tab Take  by mouth. Patient not taking: Reported on 10/29/2021    Provider, Historical     No Known Allergies   Social History     Tobacco Use    Smoking status: Never Smoker   Substance Use Topics    Alcohol use:  Yes     Alcohol/week: 1.7 standard drinks     Types: 2 Cans of beer per week     Comment: occasionally      Family History   Problem Relation Age of Onset    Diabetes Mother     Hypertension Father     Diabetes Father         Current Facility-Administered Medications   Medication Dose Route Frequency    cloNIDine HCL (CATAPRES) tablet 0.2 mg  0.2 mg Oral BID    perflutren lipid microspheres (DEFINITY) contrast injection 2 mL  2 mL IntraVENous ONCE    atenoloL (TENORMIN) tablet 25 mg  25 mg Oral DAILY    atorvastatin (LIPITOR) tablet 10 mg  10 mg Oral QHS    pantoprazole (PROTONIX) tablet 40 mg  40 mg Oral ACB    sodium chloride (NS) flush 5-40 mL  5-40 mL IntraVENous Q8H    losartan (COZAAR) tablet 100 mg  100 mg Oral DAILY    And    amLODIPine (NORVASC) tablet 10 mg  10 mg Oral DAILY    And    hydroCHLOROthiazide (MICROZIDE) capsule 12.5 mg  12.5 mg Oral DAILY    enoxaparin (LOVENOX) injection 40 mg  40 mg SubCUTAneous Q12H    furosemide (LASIX) injection 40 mg  40 mg IntraVENous BID    influenza vaccine 2021-22 (6 mos+)(PF) (FLUARIX/FLULAVAL/FLUZONE QUAD) injection 0.5 mL  1 Each IntraMUSCular PRIOR TO DISCHARGE       Review of Systems:  Constitutional: positive for fatigue  Eyes: negative  Ears, nose, mouth, throat, and face: positive for nasal congestion  Respiratory: positive for cough, wheezing or dyspnea on exertion  Cardiovascular: positive for chest pressure/discomfort, dyspnea, fatigue, orthopnea, paroxysmal nocturnal dyspnea, exertional chest pressure/discomfort, lower extremity edema, tachypnea, dyspnea on exertion  Gastrointestinal: positive for dysphagia  Genitourinary:negative  Integument/breast: negative  Hematologic/lymphatic: negative  Musculoskeletal:negative  Neurological: negative  Behavioral/Psych: negative  Endocrine: negative  Allergic/Immunologic: negative    Objective:   Vital Signs:    Visit Vitals  BP (!) 179/94 (BP 1 Location: Right arm, BP Patient Position: Sitting)   Pulse 84   Temp 98.3 °F (36.8 °C)   Resp 18   Ht 5' 11\" (1.803 m)   Wt (!) 182.2 kg (401 lb 10.9 oz)   SpO2 94%   BMI 56.02 kg/m²       O2 Device: Nasal cannula   O2 Flow Rate (L/min): 2 l/min   Temp (24hrs), Av.5 °F (36.9 °C), Min:98.2 °F (36.8 °C), Max:98.8 °F (37.1 °C)       Intake/Output:   Last shift:      10/31 0701 - 10/31 1900  In: -   Out: 275 [Urine:275]  Last 3 shifts: 10/29 1901 - 10/31 0700  In: -   Out: 325 [Urine:325]    Intake/Output Summary (Last 24 hours) at 10/31/2021 1046  Last data filed at 10/31/2021 0825  Gross per 24 hour   Intake --   Output 600 ml   Net -600 ml      Physical Exam:   General:  Alert, cooperative, no distress, appears stated age. Sitting up in chair. NO distress. On NC oxygen. Head:  Normocephalic, without obvious abnormality, atraumatic. Eyes:  Conjunctivae/corneas clear. PERRL, EOMs intact. Nose: Nares normal. Septum midline. Mucosa normal. No drainage or sinus tenderness. Throat: Lips, mucosa, and tongue normal. Teeth and gums normal.   Neck: Supple, symmetrical, trachea midline, no adenopathy, thyroid: no enlargment/tenderness/nodules, no carotid bruit and no JVD. Back:   Symmetric, no curvature. ROM normal.   Lungs:   Clear to auscultation bilaterally. Some decreased BS in bases. Chest wall:  No tenderness or deformity. Heart:  Regular rate and rhythm, S1, S2 normal, no murmur, click, rub or gallop. Abdomen:   Soft, non-tender. Bowel sounds normal. No masses,  No organomegaly. Obese. Extremities: Extremities normal, atraumatic, no cyanosis, has 1+ BLE edema. Pulses: 2+ and symmetric all extremities.    Skin: Skin color, texture, turgor normal. No rashes or lesions   Lymph nodes: Cervical, supraclavicular nodes normal.   Neurologic: Grossly nonfocal, motor is intact. Data review:     Recent Results (from the past 24 hour(s))   METABOLIC PANEL, BASIC    Collection Time: 10/31/21  3:10 AM   Result Value Ref Range    Sodium 136 136 - 145 mmol/L    Potassium 3.3 (L) 3.5 - 5.1 mmol/L    Chloride 95 (L) 97 - 108 mmol/L    CO2 38 (H) 21 - 32 mmol/L    Anion gap 3 (L) 5 - 15 mmol/L    Glucose 89 65 - 100 mg/dL    BUN 15 6 - 20 MG/DL    Creatinine 0.79 0.70 - 1.30 MG/DL    BUN/Creatinine ratio 19 12 - 20      GFR est AA >60 >60 ml/min/1.73m2    GFR est non-AA >60 >60 ml/min/1.73m2    Calcium 9.5 8.5 - 10.1 MG/DL   POC G3 - PUL    Collection Time: 10/31/21  6:24 AM   Result Value Ref Range    FIO2 (POC) 2 %    pH (POC) 7.35 7.35 - 7.45      pCO2 (POC) 73.1 (H) 35.0 - 45.0 MMHG    pO2 (POC) 73 (L) 80 - 100 MMHG    HCO3 (POC) 40.4 (H) 22 - 26 MMOL/L    sO2 (POC) 92.8 92 - 97 %    Base excess (POC) 10.2 mmol/L    Site LEFT RADIAL      Device: NASAL CANNULA      Allens test (POC) Positive      Specimen type (POC) ARTERIAL         Imaging:  I have personally reviewed the patients radiographs and have reviewed the reports:  10-29-21: CXR is clear.      10-29-21: EKG is normal.         Ramos Johnson MD

## 2021-10-31 NOTE — PROGRESS NOTES
End of Shift Note    Bedside shift change report given to [address] [place] [address] (oncoming nurse) by Jose Alejandro Ward RN (offgoing nurse). Report included the following information SBAR, Kardex, Intake/Output, MAR, Accordion and Recent Results    Shift worked:  7a-7p     Shift summary and any significant changes:     Pt SOB amb on RA to BR. Echo completed. Pt will need O2 challenge tomorrow to assess for O2 needs at d/c. Pt hoping lasix works well enough overnight so home O2 not needed, but looking forward to d/c. Concerns for physician to address:  none     Zone phone for oncoming shift:   [phone]       Activity:  Activity Level: Up ad savannah  Number times ambulated in hallways past shift: 0  Number of times OOB to chair past shift: 2    Cardiac:   Cardiac Monitoring: Yes      Cardiac Rhythm: Sinus Rhythm    Access:   Current line(s): PIV     Genitourinary:   Urinary status: voiding    Respiratory:   O2 Device: Nasal cannula  Chronic home O2 use?: NO  Incentive spirometer at bedside: NO     GI:  Last Bowel Movement Date: 10/23/21  Current diet:  ADULT DIET Regular; Low Fat/Low Chol/High Fiber/MICHAEL  Passing flatus: YES  Tolerating current diet: YES       Pain Management:   Patient states pain is manageable on current regimen: N/A    Skin:  Patricio Score: 21  Interventions: increase time out of bed    Patient Safety:  Fall Score:  Total Score: 1  Interventions: gripper socks       Length of Stay:  Expected LOS: - - -  Actual LOS: 2      Jose Alejandro Ward RN

## 2021-10-31 NOTE — PROGRESS NOTES
End of Shift Note    Bedside shift change report given to Jorge Adames Saurabh (oncoming nurse) by Jassi Thomason RN (offgoing nurse).   Report included the following information SBAR, Kardex, Intake/Output, MAR, Accordion, Recent Results and Med Rec Status    Shift worked:  7PM-7AM     Shift summary and any significant changes:     PRN   Medicine given - SEE MAR     Concerns for physician to address:       Zone phone for oncoming shift:            Jassi Thomason RN

## 2021-10-31 NOTE — PROGRESS NOTES
Hospitalist Progress Note    NAME: Segundo Felipe   :  1965   MRN:  [id number]       Assessment / Plan:  Shortness of breath likely multifactorial from pulmonary hypertension, morbid obesity anxiety undiagnosed obstructive sleep apnea/obesity hypoventilation syndrome  Hypertensive urgency  -CTA shows evidence of pulmonary hypertension but no evidence of infiltrate or pulmonary embolism  -She does have evidence of cirrhosis of the liver on CT so there could be component of interstitial edema as well  C/w  Lasix 40 mg IV twice daily. Patient reported some improvement as now he is able to lay flat. Pulmonology consulted  ABG in favor of [facility]   -proBNP is only 159  -Continue with home BP meds, systolic blood pressure still in the 180s, added clonidine-He was counseled regarding weight loss  Echo result pending  Will assess ambulatory pulse ox    Polycythemia  RBC on admission was 6.02, trending down  -Does not report any smoking     Hepatic steatosis  Possible cirrhosis  -CT shows evidence of hepatic steatosis with nodular liver  Ultrasound of the liver showed hepatic steatosis  -Abdominal outpatient follow-up with GI for new onset of cirrhosis  -He admits to social drinking only. Transaminases and total bilirubin level are normal     Morbid obesity  Hypertensive urgency  -Continue atenolol, losartan, Norvasc, hydrochlorothiazide        Code Status: Full code  Surrogate Decision Maker: Wife Sivan Garza     DVT Prophylaxis: Lovenox  GI Prophylaxis: not indicated     Baseline: From home, independent of ADLs, is a       40 or above Morbid obesity / Body mass index is 56.02 kg/m². Estimated discharge date:   Barriers: Clinical improvement  Updated wife at bedside  Code status: Full  Prophylaxis: Lovenox  Recommended Disposition: Home w/Family     Subjective:     Chief Complaint / Reason for Physician Visit  Follow-up shortness of breath. continue to feel better discussed with RN events overnight. Review of Systems:  Symptom Y/N Comments  Symptom Y/N Comments   Fever/Chills    Chest Pain n    Poor Appetite    Edema y    Cough    Abdominal Pain n    Sputum    Joint Pain     SOB/COTE y   Pruritis/Rash     Nausea/vomit    Tolerating PT/OT     Diarrhea    Tolerating Diet y    Constipation    Other       Could NOT obtain due to:      Objective:     VITALS:   Last 24hrs VS reviewed since prior progress note. Most recent are:  Patient Vitals for the past 24 hrs:   Temp Pulse Resp BP SpO2   10/31/21 0824 98.3 °F (36.8 °C) 84 18 (!) 179/94 94 %   10/31/21 0430 -- 80 18 (!) 170/100 94 %   10/31/21 0303 98.6 °F (37 °C) 79 18 (!) 187/112 94 %   10/30/21 2300 -- -- 18 (!) 170/99 96 %   10/30/21 2010 98.2 °F (36.8 °C) 81 17 (!) 185/103 94 %   10/30/21 1816 98.8 °F (37.1 °C) 74 16 (!) 187/114 92 %   10/30/21 1106 98.8 °F (37.1 °C) 73 16 (!) 188/120 94 %       Intake/Output Summary (Last 24 hours) at 10/31/2021 0837  Last data filed at 10/31/2021 0825  Gross per 24 hour   Intake --   Output 600 ml   Net -600 ml        I had a face to face encounter and independently examined this patient on 10/31/2021, as outlined below:  PHYSICAL EXAM:  General: WD, WN. Alert, cooperative, no acute distress    EENT:  EOMI. Anicteric sclerae. MMM  Resp:  CTA bilaterally, no wheezing or rales. No accessory muscle use  CV:  Regular  rhythm,  No edema  GI:  Soft, Non distended, Non tender. +Bowel sounds  Neurologic:  Alert and oriented X 3, normal speech,   Psych:   Good insight. Not anxious nor agitated  Skin:  No rashes.   No jaundice    Reviewed most current lab test results and cultures  YES  Reviewed most current radiology test results   YES  Review and summation of old records today    NO  Reviewed patient's current orders and MAR    YES  PMH/SH reviewed - no change compared to H&P  ________________________________________________________________________  Care Plan discussed with:    Comments   Patient x    Family      RN x Care Manager     Consultant                        Multidiciplinary team rounds were held today with , nursing, pharmacist and clinical coordinator. Patient's plan of care was discussed; medications were reviewed and discharge planning was addressed. ________________________________________________________________________  Total NON critical care TIME: 40Minutes    Total CRITICAL CARE TIME Spent:   Minutes non procedure based      Comments   >50% of visit spent in counseling and coordination of care     ________________________________________________________________________  Iliana Cordon MD     Procedures: see electronic medical records for all procedures/Xrays and details which were not copied into this note but were reviewed prior to creation of Plan. LABS:  I reviewed today's most current labs and imaging studies.   Pertinent labs include:  Recent Labs     10/30/21  0354 10/29/21  0943   WBC 7.5 8.1   HGB 15.5 16.7   HCT 51.6* 55.2*    236     Recent Labs     10/31/21  0310 10/30/21  0354 10/29/21  0943    136 139   K 3.3* 3.6 3.8   CL 95* 99 100   CO2 38* 35* 37*   GLU 89 86 117*   BUN 15 16 16   CREA 0.79 0.87 0.92   CA 9.5 9.1 9.1   ALB  --   --  3.5   TBILI  --   --  0.8   ALT  --   --  66       Signed: Iliana Cordon MD

## 2021-11-01 PROCEDURE — 77010033678 HC OXYGEN DAILY

## 2021-11-01 PROCEDURE — 97535 SELF CARE MNGMENT TRAINING: CPT

## 2021-11-01 PROCEDURE — 94760 N-INVAS EAR/PLS OXIMETRY 1: CPT

## 2021-11-01 PROCEDURE — 74011250637 HC RX REV CODE- 250/637: Performed by: INTERNAL MEDICINE

## 2021-11-01 PROCEDURE — 97161 PT EVAL LOW COMPLEX 20 MIN: CPT

## 2021-11-01 PROCEDURE — 97165 OT EVAL LOW COMPLEX 30 MIN: CPT

## 2021-11-01 PROCEDURE — 74011250636 HC RX REV CODE- 250/636: Performed by: INTERNAL MEDICINE

## 2021-11-01 PROCEDURE — 65660000000 HC RM CCU STEPDOWN

## 2021-11-01 RX ORDER — POTASSIUM CHLORIDE 1.5 G/1.77G
20 POWDER, FOR SOLUTION ORAL 2 TIMES DAILY WITH MEALS
Qty: 60 PACKET | Refills: 0 | Status: SHIPPED | OUTPATIENT
Start: 2021-11-01 | End: 2021-12-01

## 2021-11-01 RX ORDER — CLONIDINE HYDROCHLORIDE 0.2 MG/1
0.2 TABLET ORAL 2 TIMES DAILY
Qty: 60 TABLET | Refills: 1 | Status: SHIPPED | OUTPATIENT
Start: 2021-11-01 | End: 2021-12-31

## 2021-11-01 RX ORDER — FUROSEMIDE 40 MG/1
40 TABLET ORAL 2 TIMES DAILY
Qty: 60 TABLET | Refills: 0 | Status: SHIPPED | OUTPATIENT
Start: 2021-11-01 | End: 2021-12-01

## 2021-11-01 RX ADMIN — Medication 10 ML: at 17:40

## 2021-11-01 RX ADMIN — LOSARTAN POTASSIUM 100 MG: 100 TABLET, FILM COATED ORAL at 09:29

## 2021-11-01 RX ADMIN — ATENOLOL 25 MG: 25 TABLET ORAL at 09:30

## 2021-11-01 RX ADMIN — ENOXAPARIN SODIUM 40 MG: 100 INJECTION SUBCUTANEOUS at 21:11

## 2021-11-01 RX ADMIN — CLONIDINE HYDROCHLORIDE 0.2 MG: 0.1 TABLET ORAL at 09:29

## 2021-11-01 RX ADMIN — FUROSEMIDE 40 MG: 10 INJECTION, SOLUTION INTRAMUSCULAR; INTRAVENOUS at 17:40

## 2021-11-01 RX ADMIN — PANTOPRAZOLE SODIUM 40 MG: 40 TABLET, DELAYED RELEASE ORAL at 09:30

## 2021-11-01 RX ADMIN — HYDROCHLOROTHIAZIDE 12.5 MG: 12.5 CAPSULE ORAL at 09:29

## 2021-11-01 RX ADMIN — AMLODIPINE BESYLATE 10 MG: 5 TABLET ORAL at 09:30

## 2021-11-01 RX ADMIN — ENOXAPARIN SODIUM 40 MG: 100 INJECTION SUBCUTANEOUS at 09:29

## 2021-11-01 RX ADMIN — ATORVASTATIN CALCIUM 10 MG: 10 TABLET, FILM COATED ORAL at 21:11

## 2021-11-01 RX ADMIN — CLONIDINE HYDROCHLORIDE 0.2 MG: 0.1 TABLET ORAL at 17:40

## 2021-11-01 RX ADMIN — Medication 10 ML: at 06:42

## 2021-11-01 RX ADMIN — FUROSEMIDE 40 MG: 10 INJECTION, SOLUTION INTRAMUSCULAR; INTRAVENOUS at 09:30

## 2021-11-01 RX ADMIN — Medication 10 ML: at 21:11

## 2021-11-01 NOTE — PROGRESS NOTES
Spiritual Care Partner Volunteer visited patient at [facility] in [facility] 2 MED TELE on 11/1/2021   Documented by: Lluvia Scherer.    Paging Service  [phone]

## 2021-11-01 NOTE — PROGRESS NOTES
9.05 PM  Received a call from after hour staff Ervin Dougherty from [facility] and she informed that they didn't received HO2 order from company . Ervin Dougherty said referrals receive after 4 PM is normally review next business day. Cm notified floor cm and CM TL to follow up with [facility] medical supply . Floor RN Katherine were notified. 7.25 PM    Follow up call to [facility], an [facility] after hours phone [phone], spoke to their call center staff Marc Skinner and checked status of pt's HO2 delivery status. Abeba Ochoa obtained pt's contact informations and CM/RN contact informations and said she will pass to their on call staff to return call with updates. 6.59 PM    CM spoke to floor RN Zhanna Croft and she informed that HO2 is not delivered to pt's room yet.      Jhonny Moss MSW  ED Case Manager   Ext [phone]

## 2021-11-01 NOTE — DISCHARGE SUMMARY
Hospitalist Discharge Summary     Patient ID:  Cris Armas  [id number]  18 y.o.  1965  10/29/2021    PCP on record: Edu Ho MD    Admit date: 10/29/2021  Discharge date and time: 11/1/2021    DISCHARGE DIAGNOSIS:  Shortness of breath likely multifactorial from pulmonary hypertension, morbid obesity anxiety undiagnosed obstructive sleep apnea/obesity hypoventilation syndrome  Hypertensive urgency  Polycythemia  Hepatic steatosis  Possible cirrhosis  Morbid obesity  Hypertensive urgency      CONSULTATIONS:  IP CONSULT TO HOSPITALIST  IP CONSULT TO PULMONOLOGY    Excerpted HPI from H&P of Linette Manzo MD:    CHIEF COMPLAINT: Shortness of breath     HISTORY OF PRESENT ILLNESS:     Juan Abernathy is a 61 y.o.   male who presents with past medical history of obesity, hypertension is coming the hospital chief complaint of shortness of breath. Reports being in his usual different until about 3 months ago when he started noticing gradual onset of shortness of breath which is mostly with exertion. Reports generalized weakness along with some dizziness. Does not report much cough or phlegm. Does not report any chest pain. Denies palpitations or syncopal episodes. Denies abdominal pain, nausea or vomiting.     On arrival to ED, he was noted to have a blood pressure of 202/129. He was also noted to be hypoxic with saturations of around 88% on room air. On labs hemoglobin was 16.7. BMP shows a creatinine of 0.92. LFTs are normal.  Troponin is 42.  proBNP is 159.   CTA chest shows no evidence of pulmonary embolism but shows evidence of for pulmonary arterial hypertension along with some hepatic steatosis and also possibility of cirrhosis     We were asked to admit for work up and evaluation of the above problems.      ______________________________________________________________________  DISCHARGE SUMMARY/HOSPITAL COURSE:  for full details see H&P, daily progress notes, labs, consult notes.   Shortness of breath likely multifactorial from pulmonary hypertension, morbid obesity anxiety undiagnosed obstructive sleep apnea/obesity hypoventilation syndrome  Hypertensive urgency  Acute on chronic diastolic CHF  -CTA shows evidence of pulmonary hypertension but no evidence of infiltrate or pulmonary embolism  -She does have evidence of cirrhosis of the liver on CT so there could be component of interstitial edema as well  Status post Lasix 40 mg IV twice daily. Switch to p.o. Lasix patient reported some improvement as now he is able to lay flat. Pulmonology consulted  ABG in favor of OHS   -proBNP is only 159  -Continue with home BP meds,  BP is better controlled, continue with BP meds counseled regarding weight loss  Echo results showed EF of 45 to 50%  Patient will need home oxygen     Polycythemia  RBC on admission was 6.02, trending down  -Does not report any smoking     Hepatic steatosis  Possible cirrhosis  -CT shows evidence of hepatic steatosis with nodular liver  Ultrasound of the liver showed hepatic steatosis  -Abdominal outpatient follow-up with GI for new onset of cirrhosis  -He admits to social drinking only.  Transaminases and total bilirubin level are normal     Morbid obesity  Hypertensive urgency  -Continue atenolol, losartan, Norvasc, hydrochlorothiazide          _______________________________________________________________________  Patient seen and examined by me on discharge day. Pertinent Findings:  Gen:    Not in distress  Chest: Clear lungs  CVS:   Regular rhythm. No edema  Abd:  Soft, not distended, not tender  Neuro:  Alert,   _______________________________________________________________________  DISCHARGE MEDICATIONS:   Current Discharge Medication List      START taking these medications    Details   cloNIDine HCL (CATAPRES) 0.2 mg tablet Take 1 Tablet by mouth two (2) times a day for 60 days.   Qty: 60 Tablet, Refills: 1  Start date: 11/1/2021, End date: 12/31/2021      potassium chloride (Klor-Con) 20 mEq pack Take 1 Packet by mouth two (2) times daily (with meals) for 30 days. Qty: 60 Packet, Refills: 0  Start date: 11/1/2021, End date: 12/1/2021         CONTINUE these medications which have CHANGED    Details   furosemide (Lasix) 40 mg tablet Take 1 Tablet by mouth two (2) times a day for 30 days. Qty: 60 Tablet, Refills: 0  Start date: 11/1/2021, End date: 12/1/2021         CONTINUE these medications which have NOT CHANGED    Details   levothyroxine (SYNTHROID) 50 mcg tablet Take 50 mcg by mouth Daily (before breakfast). Indications: a condition with low thyroid hormone levels      predniSONE (DELTASONE) 10 mg tablet Take 10 mg by mouth daily. carvediloL (COREG) 25 mg tablet Take 25 mg by mouth two (2) times a day. Indications: high blood pressure      budesonide-formoteroL (Symbicort) 160-4.5 mcg/actuation HFAA Take 2 Puffs by inhalation two (2) times a day. Indications: controller medication for asthma      atorvastatin (LIPITOR) 10 mg tablet Take 1 Tab by mouth nightly. Qty: 30 Tab, Refills: 0      omeprazole (PRILOSEC) 40 mg capsule Take 1 Cap by mouth daily. Qty: 30 Cap, Refills: 0      Olmesartan-Amlodipine-HCTZ (TRIBENZOR) 40-10-12.5 mg Tab Take  by mouth. STOP taking these medications       hydroCHLOROthiazide (HYDRODIURIL) 25 mg tablet Comments:   Reason for Stopping:         hydrALAZINE (APRESOLINE) 50 mg tablet Comments:   Reason for Stopping:         spironolactone (ALDACTONE) 50 mg tablet Comments:   Reason for Stopping:         atenolol (TENORMIN) 25 mg tablet Comments:   Reason for Stopping:                 Patient Follow Up Instructions: Activity: Activity as tolerated  Diet: Cardiac Diet  Wound Care: None needed    Follow-up with PCP in 7 days.   Follow-up tests/labs     Follow-up Information     Follow up With Specialties Details Why Contact Info    Ranjit Marcos MD Family Medicine   [facility] [address] [address]  [phone]          ________________________________________________________________    Risk of deterioration: Moderate    Condition at Discharge:  Stable  __________________________________________________________________    Disposition  Home with family, no needs    ____________________________________________________________________    Code Status: Full Code  ___________________________________________________________________      Total time in minutes spent coordinating this discharge (includes going over instructions, follow-up, prescriptions, and preparing report for sign off to her PCP) :  >30 minutes    Signed:  Jose Malone MD

## 2021-11-01 NOTE — DISCHARGE INSTRUCTIONS
DISCHARGE DIAGNOSIS:  Shortness of breath likely multifactorial from pulmonary hypertension, morbid obesity anxiety undiagnosed obstructive sleep apnea/obesity hypoventilation syndrome  Hypertensive urgency  Polycythemia  Hepatic steatosis  Possible cirrhosis  Morbid obesity  Hypertensive urgency    MEDICATIONS:  · It is important that you take the medication exactly as they are prescribed. · Keep your medication in the bottles provided by the pharmacist and keep a list of the medication names, dosages, and times to be taken in your wallet. · Do not take other medications without consulting your doctor. Pain Management: per above medications    What to do at [address]:  Cardiac Diet    Recommended activity: Activity as tolerated    If you have questions regarding the hospital related prescriptions or hospital related issues please call [facility] at . You can always direct your questions to your primary care doctor if you are unable to reach your hospital physician; your PCP works as an extension of your hospital doctor just like your hospital doctor is an extension of your PCP for your time at the hospital [facility], [facility]).     If you experience any of the following symptoms then please call your primary care physician or return to the emergency room if you cannot get hold of your doctor:  Fever, chills, nausea, vomiting, diarrhea, change in mentation, falling, bleeding, shortness of breath

## 2021-11-01 NOTE — PROGRESS NOTES
Transition of Care Plan:     RUR: Not listed on chart at this time   Disposition: Home with spouse  Follow up appointments: PCP  DME needed: Home 02 referral sent to [address] [place] [address] at Discharge: Pt has vehicle at hospital, will transport self home. [place] or means to access home: Pt has access      IM Medicare Letter: N/A - commercial insurance   Is patient a BCPI-A Bundle: No                    If yes, was Bundle Letter given?:     Caregiver Contact: Pt's spouse, Nadege Alegria [phone]  Discharge Caregiver contacted prior to discharge? Unit CM to follow. 4:27 PM  CM acknowledged d/c orders. DME company asked for another updated order with \"frequency of use ( continuous or with exertion) and we need via nasal cannula added to the order. CM faxed updated order. 4:12 PM  CM acknowledged consult for home 02 set up. CM sent referrals via [employer]. CM will continue to follow for discharge planning while patient is admitted on current unit. Please contact this CM with questions or issues related to discharge.      TANI Bean  Care Manager [facility]  [phone]

## 2021-11-01 NOTE — PROGRESS NOTES
OCCUPATIONAL THERAPY EVALUATION/DISCHARGE  Patient: Mayank Benito (1 y.o. male)  Date: 11/1/2021  Primary Diagnosis: Acute respiratory failure with hypoxia ([facility] [address] [J96.01]       Precautions:        ASSESSMENT  Based on the objective data described below, the patient presents with need for supplemental O2 with activity following admission for acute respiratory failure. At baseline pt lives with his wife and is I with ADLs, IADLs and mobility. He was received seated in chair, agreeable to participate. During session pt performed toileting and grooming ADLs independently, LB dressing with mod I, and ambulated in room and hallway independently with no LOB. Pt reported no SOB during session and O2 sats stable at rest on RA, but SpO2 did decreased with activity. O2 sats 88% after LB dressing and brief ambulation to bathroom that quickly recovered to 95% with PLB on RA, but required 2 L O2 to recover >90% after ambulating in hallway. Pt returned to chair at end of session with needs met. He had no further questions or concerns for acute OT, feels his is functioning at his baseline apart from need for supplemental O2 with activity. Current Level of Function (ADLs/self-care): Independent    Functional Outcome Measure: The patient scored 100/100 on the Barthel Index outcome measure. Other factors to consider for discharge: independent baseline, supportive family     PLAN :  Recommend with staff: OOB to chair for meals    Recommendation for discharge: (in order for the patient to meet his/her long term goals)  No skilled occupational therapy/ follow up rehabilitation needs identified at this time. This discharge recommendation:  Has been made in collaboration with the attending provider and/or case management    IF patient discharges home will need the following DME: TBD       SUBJECTIVE:   Patient stated I feel a lot better, I don't feel short of breath.     OBJECTIVE DATA SUMMARY:   HISTORY:   Past Medical History:   Diagnosis Date    Contusion, chest wall 2/12/2010    Hypertension     Muscle strain of chest wall 2/12/2010     Past Surgical History:   Procedure Laterality Date    COLONOSCOPY N/A 12/3/2019    COLONOSCOPY performed by Sehrin Tomlinson [facility] at [facility] ENDOSCOPY    HX CHOLECYSTECTOMY         Prior Level of Function/Environment/Context: I with ADLs, IADLs and mobility, works as a . Lives with his wife. Expanded or extensive additional review of patient history:   Home Situation  Home Environment: Private residence  # Steps to Enter: 4  One/Two Story Residence: Two story  Living Alone: No  Support Systems: Spouse/Significant Other  Patient Expects to be Discharged to[de-identified] [employer]  Current DME Used/Available at Home: None    Hand dominance: Right    EXAMINATION OF PERFORMANCE DEFICITS:  Cognitive/Behavioral Status:  Neurologic State: Alert  Orientation Level: Oriented X4  Cognition: Follows commands  Perception: Appears intact  Perseveration: No perseveration noted  Safety/Judgement: Awareness of environment    Hearing: Auditory  Auditory Impairment: None    Vision/Perceptual:            Acuity: Within Defined Limits         Range of Motion:  AROM: Generally decreased, functional  PROM: Within functional limits    Strength:  Strength: Within functional limits     Coordination:  Coordination: Within functional limits  Fine Motor Skills-Upper: Left Intact; Right Intact    Gross Motor Skills-Upper: Left Intact; Right Intact    Tone & Sensation:  Tone: Normal  Sensation: Intact             Balance:  Sitting: Intact  Standing: Intact    Functional Mobility and Transfers for ADLs:  Bed Mobility:   Received OOB in chair    Transfers:  Sit to Stand: Independent  Stand to Sit: Independent  Toilet Transfer : Independent    ADL Assessment:  Feeding: Independent    Oral Facial Hygiene/Grooming: Independent    Bathing: Independent    Upper Body Dressing: Independent    Lower Body Dressing: Modified independent    Toileting: Independent        ADL Intervention and task modifications:       Grooming  Position Performed: Standing  Washing Hands: Independent      Lower Body Dressing Assistance  Socks: Modified independent  Position Performed: Seated in chair;Bending forward method    Toileting  Bladder Hygiene: Independent    Cognitive Retraining  Safety/Judgement: Awareness of environment    Functional Measure:    Barthel Index:  Bathin  Bladder: 10  Bowels: 10  Groomin  Dressing: 10  Feeding: 10  Mobility: 15  Stairs: 10  Toilet Use: 10  Transfer (Bed to Chair and Back): 15  Total: 100/100      The Barthel ADL Index: Guidelines  1. The index should be used as a record of what a patient does, not as a record of what a patient could do. 2. The main aim is to establish degree of independence from any help, physical or verbal, however minor and for whatever reason. 3. The need for supervision renders the patient not independent. 4. A patient's performance should be established using the best available evidence. Asking the patient, friends/relatives and nurses are the usual sources, but direct observation and common sense are also important. However direct testing is not needed. 5. Usually the patient's performance over the preceding 24-48 hours is important, but occasionally longer periods will be relevant. 6. Middle categories imply that the patient supplies over 50 per cent of the effort. 7. Use of aids to be independent is allowed. Score Interpretation (from [address] [place] [address])    Independent   60-79 Minimally independent   40-59 Partially dependent   20-39 Very dependent   <20 Totally dependent     -Tatyana Up., Barthel, D.W. (1965). Functional evaluation: the Barthel Index. [facility] ([address] (1997). The Barthel activities of daily living index: self-reporting versus actual performance in the old (> or = 75 years). Journal of [address] [place] [address] 45(9), 976-906. SADIQ Hernandez, Marge Cheatham., Brennen Schmidt, Napoleon Zuleta (1999). Measuring the change in disability after inpatient rehabilitation; comparison of the responsiveness of the Barthel Index and Functional [place] Measure. Journal of Neurology, Neurosurgery, and Psychiatry, 66(4), 214-186. BASSAM Browning, LISS Morgan, & Marion Sol M.A. (2004) Assessment of post-stroke quality of life in cost-effectiveness studies: The usefulness of the Barthel Index and the EuroQoL-5D. Quality of Life Research, 15, 199-42       Occupational Therapy Evaluation Charge Determination   History Examination Decision-Making   LOW Complexity : Brief history review  LOW Complexity : 1-3 performance deficits relating to physical, cognitive , or psychosocial skils that result in activity limitations and / or participation restrictions  LOW Complexity : No comorbidities that affect functional and no verbal or physical assistance needed to complete eval tasks       Based on the above components, the patient evaluation is determined to be of the following complexity level: LOW   Pain Rating:  Pt reported no pain during session    Activity Tolerance:   Good and desaturates with exertion and requires oxygen    After treatment patient left in no apparent distress:    Sitting in chair and Call bell within reach    COMMUNICATION/EDUCATION:   The patients plan of care was discussed with: Physical therapist and Registered nurse.      Thank you for this referral.  Susan Jackson OT  Time Calculation: 17 mins

## 2021-11-01 NOTE — PROGRESS NOTES
Pulmonary, Critical Care, and Sleep Medicine      Name: Kelvin Turner MRN: [id number]   : 1965 Hospital: [facility]   Date: 2021: Events of weekend discussed with Dr. Corinne Clarity, nursing. Notes and data reviewed. Pt weight climbing for some time. Sleesp in recliner or bed. Leg edema on admission persists but better. Weight about 400 lbs on admission. Though he wanted sleep study while here, explained to him not possible. Pt is a . We had long discussion about management of fluid retention    IMPRESSION:   · FLORIN has been evaluated by DR. Omar Reilly in our office. Has follow up already Scheduled. · Hypoxic and Hypercarbic Respiratory Failure:  increased Hgb. Still with hypoxia. Will try to wean as tolerated. Pt has increased co2 levels with pco2 of 73 and pao2 of 73 on 2L NC.   · Hypertensive Urgency: Poorly compliant with BP meds. STill with elevated blood pressures. · Volume overload and fluid retention   · Dyspnea: Agree multifactorial. Severe Obesity, Volume overload. · ?liver disease. Will Need Outpt GI or Liver eval. Hepatic Steatosis. · Polycythemia  · Possible Pulmonary Htn, Echo is pending  · Will follow oxygen needs      RECOMMENDATIONS:   · Needs follow up with sleep lab, has appt with Dr. Omar Reilly. · Daily AM weights here and at home  · Pt did not require home O2 in the past, would wean off or send home with O2 if needed  · Pt will need diuretics at home  · Dietary counseling done     Subjective:   Last 24 hrs: Overall Mr. Marybel Ramirez is feeling better. Still has some dyspnea. Still has lower extremity edema but is better. He feels like his sleeping was better last night. He is still requiring submental oxygen to keep his pulse oximetry over 90%. He asked if we could do sleep medicine evaluation in the hospital, explained to him it would need to be done as an outpatient. He has a dry cough.     Explained to him that the systemic hypertension causes him to have some stress on his heart and causing him to have some fluid in his lungs which is improving with diuretics. Overall he feels like he is improving explained him we need to do some additional testing and evaluation. He is agreeable to proceed. Initial   chief complaint: shortness of breath    This patient has been seen and evaluated at the request of Dr. Wilder Salguero for above. Patient is a 61 y.o. male  Who was on his way to work; He felt like his breathing was not normal and overall he was not in a good position to be able to drive. So he presented to the Emergency Room  for evaluation. He works as a  for [employer] and typically does 3-13 hour shifts per week. He has not been able to work for the last several weeks due to some difficulty with his breathing. He reports that he has not been able to sleep very well for almost 4 weeks. We will wake up at night with shortness of breath. Sleep in his recliner and then go back into bed. Has orthopnea. He has not been compliant with his meds. He has had about one month of bilateral extremity leg swelling. When he bends over to put on his shoes he has moderate to severe dyspnea. When he gets up into his truck seat he will have mild dyspnea that usually improves as he sits for a couple minutes. With any exertion he has mild to moderate dyspnea. He's had intermittent wheezing. Denies any reflux. He does have some recurrent dysphagia which has been happening for several months. Has mild to moderate sinus congestion. Reviewed records from 10/21/2021   PAR sleep medicine;  TeleMed visit. Patient had a history of sleep apnea was last studied on January 2012. At that time he was noted to have moderate sleep apnea with more severe sleep apnea in rem sleep overall his AHI was 16.3 with a REM index of 58.5. He had been recommended for CPAP at 12 cm.    He has been off of his CPAP for a number of years because of intolerance of the CPAP. His weight has continued to increase and he is developed more peripheral edema. He was noted to have an [place] score of 11. He has been followed by Dr. Renita Gallagher. Followed by Dr. Génesis Orantes. he denies any known drug allergies     denies any tobacco use     has occasional alcohol. he is  and has 2 kids  works for [employer] as a       Past Medical History:   Diagnosis Date    Contusion, chest wall 2/12/2010    Hypertension     Muscle strain of chest wall 2/12/2010      Past Surgical History:   Procedure Laterality Date    COLONOSCOPY N/A 12/3/2019    COLONOSCOPY performed by Stacey Trammell MD at [facility] ENDOSCOPY    HX CHOLECYSTECTOMY        Prior to Admission medications    Medication Sig Start Date End Date Taking? Authorizing Provider   levothyroxine (SYNTHROID) 50 mcg tablet Take 50 mcg by mouth Daily (before breakfast). Indications: a condition with low thyroid hormone levels   Yes Provider, Historical   predniSONE (DELTASONE) 10 mg tablet Take 10 mg by mouth daily. 10/21/21 11/2/21 Yes Provider, Historical   carvediloL (COREG) 25 mg tablet Take 25 mg by mouth two (2) times a day. Indications: high blood pressure   Yes Provider, Historical   hydroCHLOROthiazide (HYDRODIURIL) 25 mg tablet Take 25 mg by mouth daily. Indications: visible water retention, high blood pressure   Yes Provider, Historical   hydrALAZINE (APRESOLINE) 50 mg tablet Take 50 mg by mouth two (2) times a day. Indications: high blood pressure   Yes Provider, Historical   spironolactone (ALDACTONE) 50 mg tablet Take 50 mg by mouth daily. Indications: high blood pressure   Yes Provider, Historical   budesonide-formoteroL (Symbicort) 160-4.5 mcg/actuation HFAA Take 2 Puffs by inhalation two (2) times a day. Indications: controller medication for asthma   Yes Provider, Historical   furosemide (LASIX) 20 mg tablet Take 20 mg by mouth daily. Indications: visible water retention, high blood pressure   Yes Provider, Historical   atorvastatin (LIPITOR) 10 mg tablet Take 1 Tab by mouth nightly. 12/3/19  Yes Luis F Arce NP   omeprazole (PRILOSEC) 40 mg capsule Take 1 Cap by mouth daily. Patient not taking: Reported on 10/29/2021 12/3/19   Luis F Arce, DEJON   atenolol (TENORMIN) 25 mg tablet Take 25 mg by mouth daily. Patient not taking: Reported on 10/29/2021    Provider, Historical   Olmesartan-Amlodipine-HCTZ (TRIBENZOR) 40-10-12.5 mg Tab Take  by mouth. Patient not taking: Reported on 10/29/2021    Provider, Historical     No Known Allergies   Social History     Tobacco Use    Smoking status: Never Smoker   Substance Use Topics    Alcohol use:  Yes     Alcohol/week: 1.7 standard drinks     Types: 2 Cans of beer per week     Comment: occasionally      Family History   Problem Relation Age of Onset    Diabetes Mother     Hypertension Father     Diabetes Father         Current Facility-Administered Medications   Medication Dose Route Frequency    cloNIDine HCL (CATAPRES) tablet 0.2 mg  0.2 mg Oral BID    atenoloL (TENORMIN) tablet 25 mg  25 mg Oral DAILY    atorvastatin (LIPITOR) tablet 10 mg  10 mg Oral QHS    pantoprazole (PROTONIX) tablet 40 mg  40 mg Oral ACB    sodium chloride (NS) flush 5-40 mL  5-40 mL IntraVENous Q8H    losartan (COZAAR) tablet 100 mg  100 mg Oral DAILY    And    amLODIPine (NORVASC) tablet 10 mg  10 mg Oral DAILY    And    hydroCHLOROthiazide (MICROZIDE) capsule 12.5 mg  12.5 mg Oral DAILY    enoxaparin (LOVENOX) injection 40 mg  40 mg SubCUTAneous Q12H    furosemide (LASIX) injection 40 mg  40 mg IntraVENous BID    influenza vaccine 2021-22 (6 mos+)(PF) (FLUARIX/FLULAVAL/FLUZONE QUAD) injection 0.5 mL  1 Each IntraMUSCular PRIOR TO DISCHARGE       Review of Systems:  Constitutional: positive for fatigue  Eyes: negative  Ears, nose, mouth, throat, and face: positive for nasal congestion  Respiratory: positive for cough, wheezing or dyspnea on exertion  Cardiovascular: positive for chest pressure/discomfort, dyspnea, fatigue, orthopnea, paroxysmal nocturnal dyspnea, exertional chest pressure/discomfort, lower extremity edema, tachypnea, dyspnea on exertion  Gastrointestinal: positive for dysphagia  Genitourinary:negative  Integument/breast: negative  Hematologic/lymphatic: negative  Musculoskeletal:negative  Neurological: negative  Behavioral/Psych: negative  Endocrine: negative  Allergic/Immunologic: negative    Objective:   Vital Signs:    Visit Vitals  /84 (BP 1 Location: Right upper arm, BP Patient Position: Sitting)   Pulse 69   Temp 98 °F (36.7 °C)   Resp 16   Ht 5' 11\" (1.803 m)   Wt (!) 174.7 kg (385 lb 2.3 oz)   SpO2 95%   BMI 53.72 kg/m²       O2 Device: Nasal cannula   O2 Flow Rate (L/min): 2 l/min   Temp (24hrs), Av.4 °F (36.9 °C), Min:98 °F (36.7 °C), Max:99.3 °F (37.4 °C)       Intake/Output:   Last shift:      701 - 1900  In: 390 [P.O.:390]  Out: -   Last 3 shifts: 10/30 1901 -  07  In: -   Out: 1725 [Urine:1725]    Intake/Output Summary (Last 24 hours) at 2021 1602  Last data filed at 2021 1200  Gross per 24 hour   Intake 390 ml   Output 475 ml   Net -85 ml      Physical Exam:   General:  Alert, cooperative, no distress, appears stated age. Sitting up in chair. NO distress. On NC oxygen. Head:  Normocephalic, without obvious abnormality, atraumatic. Eyes:  Conjunctivae/corneas clear. PERRL, EOMs intact. Nose: Nares normal. Septum midline. Mucosa normal. No drainage or sinus tenderness. Throat: Lips, mucosa, and tongue normal. Teeth and gums normal.   Neck: Supple, symmetrical, trachea midline, no adenopathy, thyroid: no enlargment/tenderness/nodules, no carotid bruit and no JVD. Back:   Symmetric, no curvature. ROM normal.   Lungs:   Clear to auscultation bilaterally. Some decreased BS in bases. Chest wall:  No tenderness or deformity.    Heart:  Regular rate and rhythm, S1, S2 normal, no murmur, click, rub or gallop. Abdomen:   Soft, non-tender. Bowel sounds normal. No masses,  No organomegaly. Obese. Extremities: Extremities normal, atraumatic, no cyanosis, has 1+ BLE edema. Pulses: 2+ and symmetric all extremities. Skin: Skin color, texture, turgor normal. No rashes or lesions   Lymph nodes: Cervical, supraclavicular nodes normal.   Neurologic: Grossly nonfocal, motor is intact. Data review:     No results found for this or any previous visit (from the past 24 hour(s)). Imaging:  I have personally reviewed the patients radiographs and have reviewed the reports:  10-29-21: CXR is clear.      10-29-21: EKG is normal.         Rosemary Ruiz MD

## 2021-11-01 NOTE — PROGRESS NOTES
End of Shift Note    Bedside shift change report given to Gloria Eng (oncoming nurse) by Gloria Urbano RN (offgoing nurse). Report included the following information SBAR, Kardex, Intake/Output, MAR, Accordion, Recent Results and Med Rec Status    Shift worked:  7PM-7AM     Shift summary and any significant changes:    Still on 2 L NC.   Rested well duration of the shift     Concerns for physician to address:     Zone phone for oncoming shift:              Gloria Urbano RN

## 2021-11-01 NOTE — PROGRESS NOTES
PHYSICAL THERAPY EVALUATION/DISCHARGE  Patient: Charu Camacho (52 y.o. male)  Date: 11/1/2021  Primary Diagnosis: Acute respiratory failure with hypoxia ([facility] [address] [J96.01]       Precautions:         ASSESSMENT  Based on the objective data described below, the patient presents with indep LOF with mobility and ongoing need for supplemental O2 to maintain SpO2 90% with activity. Pt received in chair on 2L O2. States that he removes O2 and ambulates to bathroom independently without SOB, however demo O2 desat with activity on RA this date. Pt able to amb short distance to/ from bathroom on RA with SpO2 88%, quick recovery with rest and cues for PLB. Assessed amb on unit for increased distance on RA with SpO2 83%; required 2L O2 for recovery to >90%. Pt remains indep with mobility and therefore has no further skilled PT needs. Recommend pt amb as tolerated with continued assessment of O2 requirement by nursing/ respiratory staff. Other factors to consider for discharge: indep baseline, good family support     Further skilled acute physical therapy is not indicated at this time. PLAN :  Recommendation for discharge: (in order for the patient to meet his/her long term goals)  No skilled physical therapy/ follow up rehabilitation needs identified at this time. This discharge recommendation:  Has not yet been discussed the attending provider and/or case management    IF patient discharges home will need the following DME: none       SUBJECTIVE:   Patient stated I feel so much better; I'm getting around fine now.     OBJECTIVE DATA SUMMARY:   HISTORY:    Past Medical History:   Diagnosis Date    Contusion, chest wall 2/12/2010    Hypertension     Muscle strain of chest wall 2/12/2010     Past Surgical History:   Procedure Laterality Date    COLONOSCOPY N/A 12/3/2019    COLONOSCOPY performed by Terry Canada MD at [facility] ENDOSCOPY    HX CHOLECYSTECTOMY         Prior level of function: indep without device, works as trunk  for [employer] factors and/or comorbidities impacting plan of care: followed by pulmonary as outpt    Home Situation  Home Environment: Private residence  # Steps to Enter: 4  One/Two Story Residence: Two story  Living Alone: No  Support Systems: Spouse/Significant Other  Patient Expects to be Discharged to[de-identified] [employer]  Current DME Used/Available at Home: None    EXAMINATION/PRESENTATION/DECISION MAKING:      Hearing: Auditory  Auditory Impairment: None    Edema: present bilat LEs  Range Of Motion:  AROM: Generally decreased, functional                       Strength:    Strength: Within functional limits                   Coordination:  Coordination: Within functional limits  Vision:      Functional Mobility:  Bed Mobility:              Transfers:  Sit to Stand: Independent  Stand to Sit: Independent                       Balance:   Sitting: Intact  Standing: Intact  Ambulation/Gait Training:  Distance (ft): 100 Feet (ft)     Ambulation - Level of Assistance: Independent              Physical Therapy Evaluation Charge Determination   History Examination Presentation Decision-Making   MEDIUM  Complexity : 1-2 comorbidities / personal factors will impact the outcome/ POC  MEDIUM Complexity : 3 Standardized tests and measures addressing body structure, function, activity limitation and / or participation in recreation  LOW Complexity : Stable, uncomplicated  LOW Complexity : FOTO score of       Based on the above components, the patient evaluation is determined to be of the following complexity level: LOW     Pain Rating:  No c/o pain    Activity Tolerance:   Fair and desaturates with exertion and requires oxygen      After treatment patient left in no apparent distress:   Sitting in chair and Call bell within reach    COMMUNICATION/EDUCATION:   The patients plan of care was discussed with: Occupational therapist and Registered nurse.      Fall prevention education was provided and the patient/caregiver indicated understanding., Patient/family have participated as able in goal setting and plan of care. and Patient/family agree to work toward stated goals and plan of care.     Thank you for this referral.  Luis Shannon, PT   Time Calculation: 17 mins

## 2021-11-01 NOTE — PROGRESS NOTES
[address] November 1, 2021       RE: Landrum Buerger      To Whom It May Concern,    This is to certify that Landrum Buerger was admitted from 10/29 to 11/01  may may return to work on November 15. Please feel free to contact my office if you have any questions or concerns. Thank you for your assistance in this matter.       Sincerely,  López Vegas MD

## 2021-11-01 NOTE — PROGRESS NOTES
Bedside shift change report given to Chacha Lord RN (oncoming nurse) by Narciso Paiz RN (offgoing nurse). Report included the following information SBAR, Kardex, Procedure Summary, Intake/Output, MAR and Recent Results. Pt has discharge orders in but does not have oxygen to go home with.

## 2021-11-02 VITALS
HEIGHT: 71 IN | OXYGEN SATURATION: 93 % | BODY MASS INDEX: 44.1 KG/M2 | DIASTOLIC BLOOD PRESSURE: 81 MMHG | HEART RATE: 70 BPM | RESPIRATION RATE: 18 BRPM | SYSTOLIC BLOOD PRESSURE: 129 MMHG | TEMPERATURE: 98.7 F | WEIGHT: 315 LBS

## 2021-11-02 PROCEDURE — 74011250636 HC RX REV CODE- 250/636: Performed by: INTERNAL MEDICINE

## 2021-11-02 PROCEDURE — 94760 N-INVAS EAR/PLS OXIMETRY 1: CPT

## 2021-11-02 PROCEDURE — 74011250637 HC RX REV CODE- 250/637: Performed by: INTERNAL MEDICINE

## 2021-11-02 PROCEDURE — 77010033678 HC OXYGEN DAILY

## 2021-11-02 RX ADMIN — LOSARTAN POTASSIUM 100 MG: 100 TABLET, FILM COATED ORAL at 08:16

## 2021-11-02 RX ADMIN — ENOXAPARIN SODIUM 40 MG: 100 INJECTION SUBCUTANEOUS at 08:17

## 2021-11-02 RX ADMIN — HYDROCHLOROTHIAZIDE 12.5 MG: 12.5 CAPSULE ORAL at 08:16

## 2021-11-02 RX ADMIN — CLONIDINE HYDROCHLORIDE 0.2 MG: 0.1 TABLET ORAL at 08:16

## 2021-11-02 RX ADMIN — PANTOPRAZOLE SODIUM 40 MG: 40 TABLET, DELAYED RELEASE ORAL at 08:16

## 2021-11-02 RX ADMIN — AMLODIPINE BESYLATE 10 MG: 5 TABLET ORAL at 08:16

## 2021-11-02 RX ADMIN — FUROSEMIDE 40 MG: 10 INJECTION, SOLUTION INTRAMUSCULAR; INTRAVENOUS at 08:16

## 2021-11-02 RX ADMIN — Medication 10 ML: at 05:19

## 2021-11-02 RX ADMIN — ATENOLOL 25 MG: 25 TABLET ORAL at 08:16

## 2021-11-02 NOTE — PROGRESS NOTES
Hospitalist Progress Note    NAME: Seven Garber   :  1965   MRN:  [id number]       Assessment / Plan:  Shortness of breath likely multifactorial from pulmonary hypertension, morbid obesity anxiety undiagnosed obstructive sleep apnea/obesity hypoventilation syndrome  Hypertensive urgency  Acute on chronic diastolic CHF  -CTA shows evidence of pulmonary hypertension but no evidence of infiltrate or pulmonary embolism  -She does have evidence of cirrhosis of the liver on CT so there could be component of interstitial edema as well  C/w  Lasix 40 mg IV twice daily. Patient reported some improvement as now he is able to lay flat. Pulmonology consulted  ABG in favor of OHS   -proBNP is only 159  -Continue with home BP meds, systolic blood pressure still in the 180s, added clonidine-He was counseled regarding weight loss  Echo result showed EF of 45 to 50%  Ambulatory pulse ox reported that he needs home oxygen  Outpatient follow-up with pulmonology    Polycythemia  RBC on admission was 6.02, trending down  -Does not report any smoking     Hepatic steatosis  Possible cirrhosis  -CT shows evidence of hepatic steatosis with nodular liver  Ultrasound of the liver showed hepatic steatosis  -Abdominal outpatient follow-up with GI for new onset of cirrhosis  -He admits to social drinking only. Transaminases and total bilirubin level are normal     Morbid obesity  Hypertensive urgency  -Continue atenolol, losartan, Norvasc, hydrochlorothiazide        Code Status: Full code  Surrogate Decision Maker: Wife Elisabet Rankin     DVT Prophylaxis: Lovenox  GI Prophylaxis: not indicated     Baseline: From home, independent of ADLs, is a       40 or above Morbid obesity / Body mass index is 53.72 kg/m².     Estimated discharge date:   Barriers: Clinical improvement  Updated wife at bedside  Code status: Full  Prophylaxis: Lovenox  Recommended Disposition: Home w/Family     Subjective:     Chief Complaint / Reason for Physician Visit  Follow-up respiratory failure with hypoxia acute on chronic diastolic CHF/  Patient clinically stable to be discharged, discharge pending home oxygen set up  Patient continues to feel better  Review of Systems:  Symptom Y/N Comments  Symptom Y/N Comments   Fever/Chills    Chest Pain n    Poor Appetite    Edema y    Cough    Abdominal Pain n    Sputum    Joint Pain     SOB/COTE y   Pruritis/Rash     Nausea/vomit    Tolerating PT/OT     Diarrhea    Tolerating Diet y    Constipation    Other       Could NOT obtain due to:      Objective:     VITALS:   Last 24hrs VS reviewed since prior progress note. Most recent are:  Patient Vitals for the past 24 hrs:   Temp Pulse Resp BP SpO2   11/02/21 0419 97.5 °F (36.4 °C) 75 16 (!) 146/94 95 %   11/02/21 0031 97.1 °F (36.2 °C) 96 16 (!) 151/99 96 %   11/01/21 2024 98 °F (36.7 °C) 66 16 121/81 99 %   11/01/21 1600 -- 69 -- -- --   11/01/21 1508 98 °F (36.7 °C) 69 16 127/84 95 %   11/01/21 1200 -- 71 -- -- --   11/01/21 1041 98.4 °F (36.9 °C) 73 16 (!) 146/84 96 %       Intake/Output Summary (Last 24 hours) at 11/2/2021 0803  Last data filed at 11/1/2021 1200  Gross per 24 hour   Intake 240 ml   Output --   Net 240 ml        I had a face to face encounter and independently examined this patient on 11/2/2021, as outlined below:  PHYSICAL EXAM:  General: WD, WN. Alert, cooperative, no acute distress    EENT:  EOMI. Anicteric sclerae. MMM  Resp:  CTA bilaterally, no wheezing or rales. No accessory muscle use  CV:  Regular  rhythm,  No edema  GI:  Soft, Non distended, Non tender. +Bowel sounds  Neurologic:  Alert and oriented X 3, normal speech,   Psych:   Good insight. Not anxious nor agitated  Skin:  No rashes.   No jaundice    Reviewed most current lab test results and cultures  YES  Reviewed most current radiology test results   YES  Review and summation of old records today    NO  Reviewed patient's current orders and MAR    YES  PMH/SH reviewed - no change compared to H&P  ________________________________________________________________________  Care Plan discussed with:    Comments   Patient x    Family      RN x    Care Manager     Consultant                        Multidiciplinary team rounds were held today with , nursing, pharmacist and clinical coordinator. Patient's plan of care was discussed; medications were reviewed and discharge planning was addressed. ________________________________________________________________________  Total NON critical care TIME: 40Minutes    Total CRITICAL CARE TIME Spent:   Minutes non procedure based      Comments   >50% of visit spent in counseling and coordination of care     ________________________________________________________________________  Herb Pereira MD     Procedures: see electronic medical records for all procedures/Xrays and details which were not copied into this note but were reviewed prior to creation of Plan. LABS:  I reviewed today's most current labs and imaging studies. Pertinent labs include:  No results for input(s): WBC, HGB, HCT, PLT, HGBEXT, HCTEXT, PLTEXT, HGBEXT, HCTEXT, PLTEXT in the last 72 hours.   Recent Labs     10/31/21  0310      K 3.3*   CL 95*   CO2 38*   GLU 89   BUN 15   CREA 0.79   CA 9.5       Signed: Herb Pereira MD

## 2021-11-02 NOTE — PROGRESS NOTES
Pulmonary, Critical Care, and Sleep Medicine      Name: Rosalie Nash MRN: [id number]   : 1965 Hospital: [facility]   Date: 2021: Events of weekend discussed with Dr. Ken Jacobson, nursing. Notes and data reviewed. Pt weight climbing for some time. Sleesp in recliner or bed. Leg edema on admission persists but better. Weight about 400 lbs on admission. Though he wanted sleep study while here, explained to him not possible. Pt is a . We had long discussion about management of fluid retention    21: Pt waiting for home O2. Weight down at least 16 pounds. Still with edema. He will see PCP for blood work now that he is on Diuretic. Will see us for O2 followup and wean. Sleep study scheduled      IMPRESSION:   · FLORIN has been evaluated by DR. Paul Christopher in our office. Has follow up already Scheduled. · Hypoxic and Hypercarbic Respiratory Failure:  increased Hgb. Still with hypoxia. Will try to wean as tolerated. Pt has increased co2 levels with pco2 of 73 and pao2 of 73 on 2L NC.   · Hypertensive Urgency: Poorly compliant with BP meds. STill with elevated blood pressures. · Volume overload and fluid retention   · Dyspnea: Agree multifactorial. Severe Obesity, Volume overload. · ?liver disease. Will Need Outpt GI or Liver eval. Hepatic Steatosis. · Polycythemia  · Possible Pulmonary Htn, Echo is pending  · Will follow oxygen needs      RECOMMENDATIONS:   · Needs follow up with sleep lab, has appt with Dr. Paul Christopher. · Daily AM weights at home  · Home O2 per O2 challenge  · Pt will need daily diuretics at home  · Dietary counseling done  · F/u with us to wean off O2  · thanks     Subjective:   Last 24 hrs: Overall Mr. Nichole Cabrera is feeling better. Still has some dyspnea. Still has lower extremity edema but is better. He feels like his sleeping was better last night. He is still requiring submental oxygen to keep his pulse oximetry over 90%. He asked if we could do sleep medicine evaluation in the hospital, explained to him it would need to be done as an outpatient. He has a dry cough. Explained to him that the systemic hypertension causes him to have some stress on his heart and causing him to have some fluid in his lungs which is improving with diuretics. Overall he feels like he is improving explained him we need to do some additional testing and evaluation. He is agreeable to proceed. Initial   chief complaint: shortness of breath    This patient has been seen and evaluated at the request of Dr. Helen Dillon for above. Patient is a 61 y.o. male  Who was on his way to work; He felt like his breathing was not normal and overall he was not in a good position to be able to drive. So he presented to the Emergency Room  for evaluation. He works as a  for [employer] and typically does 3-13 hour shifts per week. He has not been able to work for the last several weeks due to some difficulty with his breathing. He reports that he has not been able to sleep very well for almost 4 weeks. We will wake up at night with shortness of breath. Sleep in his recliner and then go back into bed. Has orthopnea. He has not been compliant with his meds. He has had about one month of bilateral extremity leg swelling. When he bends over to put on his shoes he has moderate to severe dyspnea. When he gets up into his truck seat he will have mild dyspnea that usually improves as he sits for a couple minutes. With any exertion he has mild to moderate dyspnea. He's had intermittent wheezing. Denies any reflux. He does have some recurrent dysphagia which has been happening for several months. Has mild to moderate sinus congestion. Reviewed records from 10/21/2021   PAR sleep medicine;  TeleMed visit. Patient had a history of sleep apnea was last studied on January 2012.     At that time he was noted to have moderate sleep apnea with more severe sleep apnea in rem sleep overall his AHI was 16.3 with a REM index of 58.5. He had been recommended for CPAP at 12 cm. He has been off of his CPAP for a number of years because of intolerance of the CPAP. His weight has continued to increase and he is developed more peripheral edema. He was noted to have an [place] score of 11. He has been followed by Dr. Precious Luu. Followed by Dr. Daniel Angeles. he denies any known drug allergies     denies any tobacco use     has occasional alcohol. he is  and has 2 kids  works for [address] [employer] [address] as a       Past Medical History:   Diagnosis Date    Contusion, chest wall 2/12/2010    Hypertension     Muscle strain of chest wall 2/12/2010      Past Surgical History:   Procedure Laterality Date    COLONOSCOPY N/A 12/3/2019    COL[facility]ac [facility]COPY    HX CHOLECYSTECTOMY        Prior to Admission medications    Medication Sig Start Date End Date Taking? Authorizing Provider   cloNIDine HCL (CATAPRES) 0.2 mg tablet Take 1 Tablet by mouth two (2) times a day for 60 days. 11/1/21 12/31/21 Yes Clayton Morton MD   furosemide (Lasix) 40 mg tablet Take 1 Tablet by mouth two (2) times a day for 30 days. 11/1/21 12/1/21 Yes Clayton Morton MD   potassium chloride (Klor-Con) 20 mEq pack Take 1 Packet by mouth two (2) times daily (with meals) for 30 days. 11/1/21 12/1/21 Yes Clayton Morton MD   levothyroxine (SYNTHROID) 50 mcg tablet Take 50 mcg by mouth Daily (before breakfast). Indications: a condition with low thyroid hormone levels   Yes Provider, Historical   predniSONE (DELTASONE) 10 mg tablet Take 10 mg by mouth daily. 10/21/21 11/2/21 Yes Provider, Historical   carvediloL (COREG) 25 mg tablet Take 25 mg by mouth two (2) times a day.  Indications: high blood pressure   Yes Provider, Historical   hydroCHLOROthiazide (HYDRODIURIL) 25 mg tablet Take 25 mg by mouth daily. Indications: visible water retention, high blood pressure   Yes Provider, Historical   hydrALAZINE (APRESOLINE) 50 mg tablet Take 50 mg by mouth two (2) times a day. Indications: high blood pressure   Yes Provider, Historical   spironolactone (ALDACTONE) 50 mg tablet Take 50 mg by mouth daily. Indications: high blood pressure   Yes Provider, Historical   budesonide-formoteroL (Symbicort) 160-4.5 mcg/actuation HFAA Take 2 Puffs by inhalation two (2) times a day. Indications: controller medication for asthma   Yes Provider, Historical   furosemide (LASIX) 20 mg tablet Take 20 mg by mouth daily. Indications: visible water retention, high blood pressure   Yes Provider, Historical   atorvastatin (LIPITOR) 10 mg tablet Take 1 Tab by mouth nightly. 12/3/19  Yes Luis F Arce NP   omeprazole (PRILOSEC) 40 mg capsule Take 1 Cap by mouth daily. Patient not taking: Reported on 10/29/2021 12/3/19   Luis F Arce NP   atenolol (TENORMIN) 25 mg tablet Take 25 mg by mouth daily. Patient not taking: Reported on 10/29/2021    Provider, Historical   Olmesartan-Amlodipine-HCTZ (TRIBENZOR) 40-10-12.5 mg Tab Take  by mouth. Patient not taking: Reported on 10/29/2021    Provider, Historical     No Known Allergies   Social History     Tobacco Use    Smoking status: Never Smoker   Substance Use Topics    Alcohol use:  Yes     Alcohol/week: 1.7 standard drinks     Types: 2 Cans of beer per week     Comment: occasionally      Family History   Problem Relation Age of Onset    Diabetes Mother     Hypertension Father     Diabetes Father         Current Facility-Administered Medications   Medication Dose Route Frequency    cloNIDine HCL (CATAPRES) tablet 0.2 mg  0.2 mg Oral BID    atenoloL (TENORMIN) tablet 25 mg  25 mg Oral DAILY    atorvastatin (LIPITOR) tablet 10 mg  10 mg Oral QHS    pantoprazole (PROTONIX) tablet 40 mg  40 mg Oral ACB    sodium chloride (NS) flush 5-40 mL  5-40 mL IntraVENous Q8H    losartan (COZAAR) tablet 100 mg  100 mg Oral DAILY    And    amLODIPine (NORVASC) tablet 10 mg  10 mg Oral DAILY    And    hydroCHLOROthiazide (MICROZIDE) capsule 12.5 mg  12.5 mg Oral DAILY    enoxaparin (LOVENOX) injection 40 mg  40 mg SubCUTAneous Q12H    furosemide (LASIX) injection 40 mg  40 mg IntraVENous BID    influenza vaccine  (6 mos+)(PF) (FLUARIX/FLULAVAL/FLUZONE QUAD) injection 0.5 mL  1 Each IntraMUSCular PRIOR TO DISCHARGE       Review of Systems:  Constitutional: positive for fatigue  Eyes: negative  Ears, nose, mouth, throat, and face: positive for nasal congestion  Respiratory: positive for cough, wheezing or dyspnea on exertion  Cardiovascular: positive for chest pressure/discomfort, dyspnea, fatigue, orthopnea, paroxysmal nocturnal dyspnea, exertional chest pressure/discomfort, lower extremity edema, tachypnea, dyspnea on exertion  Gastrointestinal: positive for dysphagia  Genitourinary:negative  Integument/breast: negative  Hematologic/lymphatic: negative  Musculoskeletal:negative  Neurological: negative  Behavioral/Psych: negative  Endocrine: negative  Allergic/Immunologic: negative    Objective:   Vital Signs:    Visit Vitals  BP (!) 175/113   Pulse 83   Temp 98.3 °F (36.8 °C)   Resp 16   Ht 5' 11\" (1.803 m)   Wt (!) 174.7 kg (385 lb 2.3 oz)   SpO2 93%   BMI 53.72 kg/m²       O2 Device: Nasal cannula   O2 Flow Rate (L/min): 2 l/min   Temp (24hrs), Av.8 °F (36.6 °C), Min:97.1 °F (36.2 °C), Max:98.3 °F (36.8 °C)       Intake/Output:   Last shift:      701 - 1900  In: -   Out: 375 [Urine:375]  Last 3 shifts: 10/31 1901 - 700  In: 390 [P.O.:390]  Out: -     Intake/Output Summary (Last 24 hours) at 2021 1200  Last data filed at 2021 0808  Gross per 24 hour   Intake --   Output 375 ml   Net -375 ml      Physical Exam:   General:  Alert, cooperative, no distress, appears stated age. Sitting up in chair. NO distress. On NC oxygen.     Head:  Normocephalic, without obvious abnormality, atraumatic. Eyes:  Conjunctivae/corneas clear. PERRL, EOMs intact. Nose: Nares normal. Septum midline. Mucosa normal. No drainage or sinus tenderness. Throat: Lips, mucosa, and tongue normal. Teeth and gums normal.   Neck: Supple, symmetrical, trachea midline, no adenopathy, thyroid: no enlargment/tenderness/nodules, no carotid bruit and no JVD. Back:   Symmetric, no curvature. ROM normal.   Lungs:   Clear to auscultation bilaterally. Some decreased BS in bases. Chest wall:  No tenderness or deformity. Heart:  Regular rate and rhythm, S1, S2 normal, no murmur, click, rub or gallop. Abdomen:   Soft, non-tender. Bowel sounds normal. No masses,  No organomegaly. Obese. Extremities: Extremities normal, atraumatic, no cyanosis, has 1+ BLE edema. Pulses: 2+ and symmetric all extremities. Skin: Skin color, texture, turgor normal. No rashes or lesions   Lymph nodes: Cervical, supraclavicular nodes normal.   Neurologic: Grossly nonfocal, motor is intact. Data review:     No results found for this or any previous visit (from the past 24 hour(s)). Imaging:  I have personally reviewed the patients radiographs and have reviewed the reports:  10-29-21: CXR is clear.      10-29-21: EKG is normal.         Aden Almonte MD

## 2021-11-02 NOTE — PROGRESS NOTES
Physician Progress Note      PATIENTSevero Nunn  CSN #:                  [id number]  :                       1965  ADMIT DATE:       10/29/2021 9:01 AM  [address] [place] DATE:  RESPONDING  PROVIDER #:        Jj Crandall MD          QUERY TEXT:    Dr Bernarda Wu:    Pt admitted with SOB multifactorial. from Pulmonary HTN, morbid obesity, volume overload. Noted documentation of Hypoxic and Hypercarbic Respiratory Failure on 10/31 by Pulmonology consultant. If possible, please document in progress notes and discharge summary:    The medical record reflects the following:  Risk Factors: 56yoM morbid obesity, pulmonary HTN, HTN urgency, anxiety, volume overload  Clinical Indicators: SOB, reports he feels like he cannot catch his breath, reports while ambulating to his car at work he became very lightheaded and winded. ER VS:  )  (!) 88 % -- 90%  on RA w/ RR 31 - 25  10/29 Pulmonology PN: Hypoxic and Hypercarbic Respiratory Failure:  still with hypoxia. Pt has increased co2 levels with pco2 of 73 and pao2 of 73 on 2L NC. Treatment: ABG, supplemental O2, IV diuretic, Assess for oxygen needs. Thank Tatiana Hardwick RN, CDI  Options provided:  -- Hypoxic and Hypercarbic Respiratory Failure confirmed present on admission  -- Hypoxic and Hypercarbic Respiratory Failure confirmed not present on admission  -- Hypoxic and Hypercarbic Respiratory Failure ruled out  -- Other - I will add my own diagnosis  -- Disagree - Not applicable / Not valid  -- Disagree - Clinically unable to determine / Unknown  -- Refer to Clinical Documentation Reviewer    PROVIDER RESPONSE TEXT:    The diagnosis of Hypoxic and Hypercarbic Respiratory Failure was confirmed as present on admission.     Query created by: Theresa Worthy on 2021 10:57 AM      Electronically signed by:  Jj Crandall MD 2021 11:22 AM

## 2021-11-02 NOTE — PROGRESS NOTES
Problem: Hypertension  Goal: *Blood pressure within specified parameters  Outcome: Resolved/Met  Goal: *Fluid volume balance  Outcome: Resolved/Met  Goal: *Labs within defined limits  Outcome: Resolved/Met     Problem: Falls - Risk of  Goal: *Absence of Falls  Description: Document Kendell Fall Risk and appropriate interventions in the flowsheet.   Outcome: Resolved/Met

## 2021-11-02 NOTE — PROGRESS NOTES
I have reviewed discharge instructions with the patient. The patient verbalized understanding. Informed the patient that the scripts have been sent to his pharmacy, he also confirmed it is the right pharmacy. IV and tele monitor removed. Educated the patient on how to use the portable oxygen tank. A signed copy of AVS left in chart. Patient was taken to discharge area by SCOTTIE Dahl.

## 2022-03-18 PROBLEM — K57.31 DIVERTICULOSIS OF COLON WITH HEMORRHAGE: Status: ACTIVE | Noted: 2019-12-03

## 2022-03-18 PROBLEM — E66.01 MORBID OBESITY (HCC): Status: ACTIVE | Noted: 2019-12-03

## 2022-03-19 PROBLEM — D64.9 ANEMIA: Status: ACTIVE | Noted: 2019-12-03

## 2022-03-19 PROBLEM — K92.2 GI BLEED: Status: ACTIVE | Noted: 2019-11-29

## 2022-03-19 PROBLEM — J96.01 ACUTE RESPIRATORY FAILURE WITH HYPOXIA (HCC): Status: ACTIVE | Noted: 2021-10-29

## 2022-07-25 ENCOUNTER — NURSE TRIAGE (OUTPATIENT)
Dept: OTHER | Facility: CLINIC | Age: 57
End: 2022-07-25

## 2022-07-25 NOTE — TELEPHONE ENCOUNTER
Received call from Alize Fernando at [facility] with [employer] Complaint; Patient with Red Flag Complaint requesting to establish care with any PCP in the [facility]. Subjective: Caller states \"congestion, trouble breathing\"     Current Symptoms:   Was out of work, lost insurance, hasn't been to the doctor since October  Was hospitalized for 2-3 days in October  Now has medicaid  SOB with exertion  Cough and runny nose at times, mostly in the mornings and evenings    Onset: 1 week ago; waxing and waning    Associated Symptoms: NA    Pain Severity: denies, just states chest feels tight    Temperature:  denies    What has been tried: inhaler works for a couple hours, nebulizer, OTC mucous relief pills, coricedon     LMP: NA Pregnant: NA    Recommended disposition: Go to Office Now, advised caller if unable to get an appointment in the suggested time frame to go to an [facility], caller agreeable. Care advice provided, patient verbalizes understanding; denies any other questions or concerns; instructed to call back for any new or worsening symptoms. Patient/Caller agrees with recommended disposition; writer provided warm transfer to [address] [place] [address] at [facility] for appointment scheduling. Attention Provider: Thank you for allowing me to participate in the care of your patient. The patient was connected to triage in response to information provided to the [facility]. Please do not respond through this encounter as the response is not directed to a shared pool.     Reason for Disposition   MILD difficulty breathing (e.g., minimal/no SOB at rest, SOB with walking, pulse < 100) of new-onset or worse than normal    Protocols used: Breathing Difficulty-ADULT-[place]

## 2022-08-03 ENCOUNTER — OFFICE VISIT (OUTPATIENT)
Dept: FAMILY MEDICINE CLINIC | Age: 57
End: 2022-08-03
Payer: MEDICAID

## 2022-08-03 VITALS
OXYGEN SATURATION: 95 % | HEART RATE: 72 BPM | DIASTOLIC BLOOD PRESSURE: 75 MMHG | RESPIRATION RATE: 18 BRPM | TEMPERATURE: 97.3 F | WEIGHT: 315 LBS | SYSTOLIC BLOOD PRESSURE: 128 MMHG | BODY MASS INDEX: 44.1 KG/M2 | HEIGHT: 71 IN

## 2022-08-03 DIAGNOSIS — Z76.89 ENCOUNTER TO ESTABLISH CARE: Primary | ICD-10-CM

## 2022-08-03 DIAGNOSIS — E78.00 HYPERCHOLESTEREMIA: ICD-10-CM

## 2022-08-03 DIAGNOSIS — Z09 FOLLOW-UP EXAM AFTER TREATMENT: ICD-10-CM

## 2022-08-03 DIAGNOSIS — Z87.09: ICD-10-CM

## 2022-08-03 DIAGNOSIS — I10 PRIMARY HYPERTENSION: ICD-10-CM

## 2022-08-03 PROCEDURE — 99203 OFFICE O/P NEW LOW 30 MIN: CPT | Performed by: PHYSICIAN ASSISTANT

## 2022-08-03 RX ORDER — ATORVASTATIN CALCIUM 10 MG/1
10 TABLET, FILM COATED ORAL
Qty: 90 TABLET | Refills: 1 | Status: SHIPPED | OUTPATIENT
Start: 2022-08-03

## 2022-08-03 RX ORDER — VALSARTAN 320 MG/1
320 TABLET ORAL DAILY
COMMUNITY
Start: 2022-06-03

## 2022-08-03 RX ORDER — CLONIDINE HYDROCHLORIDE 0.3 MG/1
0.3 TABLET ORAL 2 TIMES DAILY
COMMUNITY
Start: 2022-06-29

## 2022-08-03 RX ORDER — FUROSEMIDE 40 MG/1
40 TABLET ORAL DAILY
COMMUNITY
Start: 2022-07-28

## 2022-08-03 RX ORDER — ALBUTEROL SULFATE 90 UG/1
AEROSOL, METERED RESPIRATORY (INHALATION)
COMMUNITY
Start: 2022-07-15

## 2022-08-03 RX ORDER — SPIRONOLACTONE 25 MG/1
TABLET ORAL
COMMUNITY
Start: 2022-07-09

## 2022-08-03 RX ORDER — HYDRALAZINE HYDROCHLORIDE 50 MG/1
1 TABLET, FILM COATED ORAL 2 TIMES DAILY
COMMUNITY
Start: 2022-03-03

## 2022-08-03 RX ORDER — IPRATROPIUM BROMIDE AND ALBUTEROL SULFATE 2.5; .5 MG/3ML; MG/3ML
SOLUTION RESPIRATORY (INHALATION)
COMMUNITY
Start: 2022-07-25

## 2022-08-03 NOTE — PROGRESS NOTES
Cris Armas is a 61 y.o. male who presents to the office today with the following:  Chief Complaint   Patient presents with    [facility]    Pt presents to establish care with out office. Says he is closer to [place] and may end up there, but waiting for Dr. Pranav Morales to return. Pt reports his previous PCP moved, but was under the care of Dr. Child Brothers. Had acute bronchitis last week, seen in ER, also told he needed to f/up with PCP. Mainly appears to have been concerns over his BP. This has improved with pt compliance since his visit and is doing well. He reports doing much better. No longer has chest symptoms. Per notes, ER work-up was unremarkable including troponin, BNP, CXR, CMP, CBC, EKG. Also was neg for flu and covid. Last ECHO done last yr impression \"there is mild concentric LV hypertrophy with normal LV systolic function, EF 55 - 60%, and mild diastolic dysfunction. \"  Using nebulizer (duboneb, works better for him than the albuterol). He admits to feeling his heart racing at times, but thinks it has only been after using albuterol. Otherwise, aside from the bronchitis, pt has felt to be in good health lately. He denies any symptoms today, aside from some residual leg swelling he got from tx with prednisone. He states this has steadily improved with his diuretics and is about back to his baseline. PMH (per previous notes from [facility]) known pulm HTN, obesity hypoventilation syndrome, FLORIN, malignant HTN, polycythemia, hepatic steatosis/possible cirrhosis, morbid obesity. Has CPAP machine. He also admits to being prescribed atorvastatin but did not get refills. He admits to \"not keeping up with my pills\" when he gets busy. Would like to resume. ROS  See HPI.     Past Medical History:   Diagnosis Date    Contusion, chest wall 2/12/2010    Hypertension     Muscle strain of chest wall 2/12/2010       Past Surgical History:   Procedure Laterality Date    COLONOSCOPY N/A 12/3/2019    COLONOSCOPY performed by Gigi Batres MD at [facility] ENDOSCOPY    HX CHOLECYSTECTOMY         No Known Allergies    Current Outpatient Medications   Medication Sig    albuterol (PROVENTIL HFA, VENTOLIN HFA, PROAIR HFA) 90 mcg/actuation inhaler INHALE 2 PUFFS BY MOUTH EVERY 6 HOURS AS NEEDED FOR WHEEZING    furosemide (LASIX) 40 mg tablet Take 40 mg by mouth in the morning. cloNIDine HCL (CATAPRES) 0.3 mg tablet Take 0.3 mg by mouth two (2) times a day. spironolactone (ALDACTONE) 25 mg tablet     hydrALAZINE (APRESOLINE) 50 mg tablet Take 1 Tablet by mouth two (2) times a day. valsartan (DIOVAN) 320 mg tablet Take 320 mg by mouth in the morning. albuterol-ipratropium (DUO-NEB) 2.5 mg-0.5 mg/3 ml nebu USE 1 AMPULE IN NEBULIZER EVERY 6 HOURS    atorvastatin (LIPITOR) 10 mg tablet Take 1 Tablet by mouth nightly. levothyroxine (SYNTHROID) 50 mcg tablet Take 50 mcg by mouth Daily (before breakfast). Indications: a condition with low thyroid hormone levels    carvediloL (COREG) 25 mg tablet Take 25 mg by mouth two (2) times a day. Indications: high blood pressure     No current facility-administered medications for this visit. Social History     Socioeconomic History    Marital status:    Tobacco Use    Smoking status: Never    Smokeless tobacco: Never   Vaping Use    Vaping Use: Never used   Substance and Sexual Activity    Alcohol use:  Yes     Alcohol/week: 1.7 standard drinks     Types: 2 Cans of beer per week     Comment: occasionally    Drug use: Never       Family History   Problem Relation Age of Onset    Diabetes Mother     Hypertension Father     Diabetes Father          Physical Exam:  Visit Vitals  /75 (BP 1 Location: Left upper arm, BP Patient Position: Sitting, BP Cuff Size: Large adult)   Pulse 72   Temp 97.3 °F (36.3 °C) (Oral)   Resp 18   Ht 5' 11\" (1.803 m)   Wt (!) 373 lb (169.2 kg)   SpO2 95%   BMI 52.02 kg/m²     Physical Exam  Vitals and nursing note reviewed. Constitutional:       Appearance: Normal appearance. He is obese. HENT:      Head: Normocephalic and atraumatic. Cardiovascular:      Rate and Rhythm: Normal rate and regular rhythm. Pulses: Normal pulses. Heart sounds: Normal heart sounds. Pulmonary:      Effort: Pulmonary effort is normal. No respiratory distress. Breath sounds: Normal breath sounds. No stridor. No wheezing, rhonchi or rales. Musculoskeletal:      Right lower leg: Edema (1+) present. Left lower leg: Edema (1+) present. Skin:     General: Skin is warm and dry. Neurological:      Mental Status: He is alert. Assessment/Plan:    ICD-10-CM ICD-9-CM    1. Encounter to establish care  Z76.89 V65.8       2. Follow-up exam after treatment  Z09 V67.9       3. H/O acute bronchitis  Z87.09 V12.69       4. Hypercholesteremia  E78.00 272.0 atorvastatin (LIPITOR) 10 mg tablet      5. Primary hypertension  I10 401.9         Pt improved from recent bronchitis episode. ER Notes/results reviewed. His BP is also now stable on his current regimen. he will return w/in the month for fasting labs and recheck. He will resume his cholesterol medication. He agrees to [facility] care in interim for any new/recurring sxs or other concerns. Pt verbalizes understanding and agrees with the plan.     Eugene Chandler PA-C

## 2022-08-03 NOTE — PROGRESS NOTES
1. \"Have you been to the ER, urgent care clinic since your last visit? Hospitalized since your last visit? \"  Yes - 7/25/22 - [facility] ER - SOB, Chest Pain     2. \"Have you seen or consulted any other health care providers outside of the [address] [place] [address] since your last visit? \"  Yes - 2/8/22 - [address] [place] [address]      3. For patients aged 39-70: Has the patient had a colonoscopy / FIT/ Cologuard? Yes - no Care Gap present      If the patient is female:    4. For patients aged 41-77: Has the patient had a mammogram within the past 2 years? NA - based on age or sex      11. For patients aged 21-65: Has the patient had a pap smear?  NA - based on age or sex

## 2022-09-12 ENCOUNTER — OFFICE VISIT (OUTPATIENT)
Dept: FAMILY MEDICINE CLINIC | Age: 57
End: 2022-09-12
Payer: MEDICAID

## 2022-09-12 VITALS
DIASTOLIC BLOOD PRESSURE: 98 MMHG | SYSTOLIC BLOOD PRESSURE: 168 MMHG | BODY MASS INDEX: 44.1 KG/M2 | HEIGHT: 71 IN | TEMPERATURE: 97 F | RESPIRATION RATE: 18 BRPM | OXYGEN SATURATION: 98 % | WEIGHT: 315 LBS | HEART RATE: 78 BPM

## 2022-09-12 DIAGNOSIS — I10 PRIMARY HYPERTENSION: Primary | ICD-10-CM

## 2022-09-12 DIAGNOSIS — E78.00 HYPERCHOLESTEREMIA: ICD-10-CM

## 2022-09-12 DIAGNOSIS — Z87.09 H/O BRONCHITIS: ICD-10-CM

## 2022-09-12 DIAGNOSIS — Z86.2 H/O IRON DEFICIENCY ANEMIA: ICD-10-CM

## 2022-09-12 DIAGNOSIS — Z11.59 NEED FOR HEPATITIS C SCREENING TEST: ICD-10-CM

## 2022-09-12 PROCEDURE — 36415 COLL VENOUS BLD VENIPUNCTURE: CPT | Performed by: PHYSICIAN ASSISTANT

## 2022-09-12 PROCEDURE — 99214 OFFICE O/P EST MOD 30 MIN: CPT | Performed by: PHYSICIAN ASSISTANT

## 2022-09-12 NOTE — PROGRESS NOTES
Liliam Roach is a 61 y.o. male who presents to the office today with the following:  Chief Complaint   Patient presents with    Follow-up     1 month    Hypertension       Follow-up    Hypertension    Presents for routine f/up and is fasting for BW. He reports feeling well today with no complaints. HTN  Getting 130-140s/80s at home. Says he has some WCS and often higher in office. Hypercholesterolemia. Pt says he has been taking his atorvastatin. H/o anemia noted in PMH. Says he did have to take iron pills at one time. H/o acute bronchitis. Pt admits gets occasional wheeze still. Uses albuterol prn, maybe 1-2 times daily every few days or so. Had symbicort from ER. Unclear etiology in hx. Pt says he wast told he had acute bronchitis, but never chronic/COPD/or asthma. Has been the [facility], but notes only went for CPAP. He thinks he had PFTs done, but is not sure, recalls what sounds more like peak flow. Unable to locate a PFT in chart. Pt denies any new/worsening sob or cough, says he feels symptoms are manageable and declines further work-up or tx at this time. ROS  See HPI. Past Medical History:   Diagnosis Date    Contusion, chest wall 2/12/2010    Hypertension     Muscle strain of chest wall 2/12/2010       Past Surgical History:   Procedure Laterality Date    COLONOSCOPY N/A 12/3/2019    COLONOSCOPY performed by Isidro Rod MD at [facility] ENDOSCOPY    HX CHOLECYSTECTOMY         No Known Allergies    Current Outpatient Medications   Medication Sig    albuterol (PROVENTIL HFA, VENTOLIN HFA, PROAIR HFA) 90 mcg/actuation inhaler INHALE 2 PUFFS BY MOUTH EVERY 6 HOURS AS NEEDED FOR WHEEZING    furosemide (LASIX) 40 mg tablet Take 40 mg by mouth in the morning. cloNIDine HCL (CATAPRES) 0.3 mg tablet Take 0.3 mg by mouth two (2) times a day.     spironolactone (ALDACTONE) 25 mg tablet     hydrALAZINE (APRESOLINE) 50 mg tablet Take 1 Tablet by mouth two (2) times a day.    valsartan (DIOVAN) 320 mg tablet Take 320 mg by mouth in the morning. albuterol-ipratropium (DUO-NEB) 2.5 mg-0.5 mg/3 ml nebu USE 1 AMPULE IN NEBULIZER EVERY 6 HOURS    atorvastatin (LIPITOR) 10 mg tablet Take 1 Tablet by mouth nightly. levothyroxine (SYNTHROID) 50 mcg tablet Take 50 mcg by mouth Daily (before breakfast). Indications: a condition with low thyroid hormone levels    carvediloL (COREG) 25 mg tablet Take 25 mg by mouth two (2) times a day. Indications: high blood pressure     No current facility-administered medications for this visit. Social History     Socioeconomic History    Marital status:    Tobacco Use    Smoking status: Never    Smokeless tobacco: Never   Vaping Use    Vaping Use: Never used   Substance and Sexual Activity    Alcohol use: Yes     Alcohol/week: 1.7 standard drinks     Types: 2 Cans of beer per week     Comment: occasionally    Drug use: Never       Family History   Problem Relation Age of Onset    Diabetes Mother     Hypertension Father     Diabetes Father          Physical Exam:  Visit Vitals  BP (!) 136/100 (BP 1 Location: Left upper arm, BP Patient Position: Sitting, BP Cuff Size: Large adult)   Pulse 78   Temp 97 °F (36.1 °C) (Temporal)   Resp 18   Ht 5' 11\" (1.803 m)   Wt (!) 375 lb (170.1 kg)   SpO2 98%   BMI 52.30 kg/m²     Physical Exam  Vitals and nursing note reviewed. Constitutional:       Appearance: Normal appearance. He is obese. HENT:      Head: Normocephalic and atraumatic. Cardiovascular:      Rate and Rhythm: Normal rate and regular rhythm. Pulses: Normal pulses. Heart sounds: Normal heart sounds. Pulmonary:      Effort: Pulmonary effort is normal.      Breath sounds: Normal breath sounds. Musculoskeletal:         General: No swelling. Skin:     General: Skin is warm and dry. Neurological:      Mental Status: He is alert. Assessment/Plan:    ICD-10-CM ICD-9-CM    1.  Primary hypertension  I10 401.9 METABOLIC PANEL, COMPREHENSIVE      METABOLIC PANEL, COMPREHENSIVE      2. Hypercholesteremia  E78.00 272.0 LIPID PANEL      LIPID PANEL      3. Need for hepatitis C screening test  Z11.59 V73.89 HEPATITIS C AB      HEPATITIS C AB      4. H/O iron deficiency anemia  Z86.2 V12.3 CBC WITH AUTOMATED DIFF      CBC WITH AUTOMATED DIFF      5. H/O bronchitis  Z87.09 V12.69       Will notify patient of lab results and any further recommendations. Also recommend pt work on LMs for BP. Keep log and drop by in 2 weeks and nurse recheck with home cuff for accuracy. Offered referral for PFTs, but pt declines for now. Also would like to hold off on trying other inhalers, feels he is managing ok. Seek care in interim for any new sxs or other concerns. Pt verbalizes understanding and agrees with the plan.     Aimee De Guzman PA-C

## 2022-09-12 NOTE — PATIENT INSTRUCTIONS
High Blood Pressure: Care Instructions  Overview     It's normal for blood pressure to go up and down throughout the day. But if it stays up, you have high blood pressure. Another name for high blood pressure is hypertension. Despite what a lot of people think, high blood pressure usually doesn't cause headaches or make you feel dizzy or lightheaded. It usually has no symptoms. But it does increase your risk of stroke, heart attack, and other problems. You and your doctor will talk about your risks of these problems based on your blood pressure. Your doctor will give you a goal for your blood pressure. Your goal will be based on your health and your age. Lifestyle changes, such as eating healthy and being active, are always important to help lower blood pressure. You might also take medicine to reach your blood pressure goal.  Follow-up care is a key part of your treatment and safety. Be sure to make and go to all appointments, and call your doctor if you are having problems. It's also a good idea to know your test results and keep a list of the medicines you take. How can you care for yourself at home? Medical treatment  If you stop taking your medicine, your blood pressure will go back up. You may take one or more types of medicine to lower your blood pressure. Be safe with medicines. Take your medicine exactly as prescribed. Call your doctor if you think you are having a problem with your medicine. Talk to your doctor before you start taking aspirin every day. Aspirin can help certain people lower their risk of a heart attack or stroke. But taking aspirin isn't right for everyone, because it can cause serious bleeding. See your doctor regularly. You may need to see the doctor more often at first or until your blood pressure comes down. If you are taking blood pressure medicine, talk to your doctor before you take decongestants or anti-inflammatory medicine, such as ibuprofen.  Some of these medicines can raise blood pressure. Learn how to check your blood pressure at home. Lifestyle changes  Stay at a healthy weight. This is especially important if you put on weight around the waist. Losing even 10 pounds can help you lower your blood pressure. If your doctor recommends it, get more exercise. Walking is a good choice. Bit by bit, increase the amount you walk every day. Try for at least 30 minutes on most days of the week. You also may want to swim, bike, or do other activities. Avoid or limit alcohol. Talk to your doctor about whether you can drink any alcohol. Try to limit how much sodium you eat to less than 2,300 milligrams (mg) a day. Your doctor may ask you to try to eat less than 1,500 mg a day. Eat plenty of fruits (such as bananas and oranges), vegetables, legumes, whole grains, and low-fat dairy products. Lower the amount of saturated fat in your diet. Saturated fat is found in animal products such as milk, cheese, and meat. Limiting these foods may help you lose weight and also lower your risk for heart disease. Do not smoke. Smoking increases your risk for heart attack and stroke. If you need help quitting, talk to your doctor about stop-smoking programs and medicines. These can increase your chances of quitting for good. When should you call for help? Call 911  anytime you think you may need emergency care. This may mean having symptoms that suggest that your blood pressure is causing a serious heart or blood vessel problem. Your blood pressure may be over 180/120. For example, call 911 if:    You have symptoms of a heart attack. These may include:  Chest pain or pressure, or a strange feeling in the chest.  Sweating. Shortness of breath. Nausea or vomiting. Pain, pressure, or a strange feeling in the back, neck, jaw, or upper belly or in one or both shoulders or arms. Lightheadedness or sudden weakness. A fast or irregular heartbeat. You have symptoms of a stroke.  These may include:  Sudden numbness, tingling, weakness, or loss of movement in your face, arm, or leg, especially on only one side of your body. Sudden vision changes. Sudden trouble speaking. Sudden confusion or trouble understanding simple statements. Sudden problems with walking or balance. A sudden, severe headache that is different from past headaches. You have severe back or belly pain. Do not wait until your blood pressure comes down on its own. Get help right away. Call your doctor now or seek immediate care if:    Your blood pressure is much higher than normal (such as 180/120 or higher), but you don't have symptoms. You think high blood pressure is causing symptoms, such as:  Severe headache. Blurry vision. Watch closely for changes in your health, and be sure to contact your doctor if:    Your blood pressure measures higher than your doctor recommends at least 2 times. That means the top number is higher or the bottom number is higher, or both. You think you may be having side effects from your blood pressure medicine. Where can you learn more? Go to http://www.gray.com/  Enter [id number] in the search box to learn more about \"High Blood Pressure: Care Instructions. \"  Current as of: January 10, 2022               Content Version: 13.2  © 2006-2022 [employer]. Care instructions adapted under license by [employer] (which disclaims liability or warranty for this information). If you have questions about a medical condition or this instruction, always ask your healthcare professional. [place] [address] any warranty or liability for your use of this information. DASH Diet: Care Instructions  Your Care Instructions     The DASH diet is an eating plan that can help lower your blood pressure. DASH stands for Dietary Approaches to Stop Hypertension. Hypertension is high blood pressure.   The DASH diet focuses on eating foods that are high in calcium, potassium, and magnesium. These nutrients can lower blood pressure. The foods that are highest in these nutrients are fruits, vegetables, low-fat dairy products, nuts, seeds, and legumes. But taking calcium, potassium, and magnesium supplements instead of eating foods that are high in those nutrients does not have the same effect. The DASH diet also includes whole grains, fish, and poultry. The DASH diet is one of several lifestyle changes your doctor may recommend to lower your high blood pressure. Your doctor may also want you to decrease the amount of sodium in your diet. Lowering sodium while following the DASH diet can lower blood pressure even further than just the DASH diet alone. Follow-up care is a key part of your treatment and safety. Be sure to make and go to all appointments, and call your doctor if you are having problems. It's also a good idea to know your test results and keep a list of the medicines you take. How can you care for yourself at home? Following the DASH diet  Eat 4 to 5 servings of fruit each day. A serving is 1 medium-sized piece of fruit, ½ cup chopped or canned fruit, 1/4 cup dried fruit, or 4 ounces (½ cup) of fruit juice. Choose fruit more often than fruit juice. Eat 4 to 5 servings of vegetables each day. A serving is 1 cup of lettuce or raw leafy vegetables, ½ cup of chopped or cooked vegetables, or 4 ounces (½ cup) of vegetable juice. Choose vegetables more often than vegetable juice. Get 2 to 3 servings of low-fat and fat-free dairy each day. A serving is 8 ounces of milk, 1 cup of yogurt, or 1 ½ ounces of cheese. Eat 6 to 8 servings of grains each day. A serving is 1 slice of bread, 1 ounce of dry cereal, or ½ cup of cooked rice, pasta, or cooked cereal. Try to choose whole-grain products as much as possible. Limit lean meat, poultry, and fish to 2 servings each day. A serving is 3 ounces, about the size of a deck of cards.   Eat 4 to 5 servings of nuts, seeds, and legumes (cooked dried beans, lentils, and split peas) each week. A serving is 1/3 cup of nuts, 2 tablespoons of seeds, or ½ cup of cooked beans or peas. Limit fats and oils to 2 to 3 servings each day. A serving is 1 teaspoon of vegetable oil or 2 tablespoons of salad dressing. Limit sweets and added sugars to 5 servings or less a week. A serving is 1 tablespoon jelly or jam, ½ cup sorbet, or 1 cup of lemonade. Eat less than 2,300 milligrams (mg) of sodium a day. If you limit your sodium to 1,500 mg a day, you can lower your blood pressure even more. Be aware that all of these are the suggested number of servings for people who eat 1,800 to 2,000 calories a day. Your recommended number of servings may be different if you need more or fewer calories. Tips for success  Start small. Do not try to make dramatic changes to your diet all at once. You might feel that you are missing out on your favorite foods and then be more likely to not follow the plan. Make small changes, and stick with them. Once those changes become habit, add a few more changes. Try some of the following:  Make it a goal to eat a fruit or vegetable at every meal and at snacks. This will make it easy to get the recommended amount of fruits and vegetables each day. Try yogurt topped with fruit and nuts for a snack or healthy dessert. Add lettuce, tomato, cucumber, and onion to sandwiches. Combine a ready-made pizza crust with low-fat mozzarella cheese and lots of vegetable toppings. Try using tomatoes, squash, spinach, broccoli, carrots, cauliflower, and onions. Have a variety of cut-up vegetables with a low-fat dip as an appetizer instead of chips and dip. Sprinkle sunflower seeds or chopped almonds over salads. Or try adding chopped walnuts or almonds to cooked vegetables. Try some vegetarian meals using beans and peas. Add garbanzo or kidney beans to salads.  Make burritos and tacos with mashed hendrix beans or black beans. Where can you learn more? Go to http://www.Niche.com/  Enter H967 in the search box to learn more about \"DASH Diet: Care Instructions. \"  Current as of: January 10, 2022               Content Version: 13.2  © 4676-9813 [employer]. Care instructions adapted under license by [employer] (which disclaims liability or warranty for this information). If you have questions about a medical condition or this instruction, always ask your healthcare professional. [address] any warranty or liability for your use of this information.

## 2022-09-12 NOTE — PROGRESS NOTES
1. \"Have you been to the ER, urgent care clinic since your last visit? Hospitalized since your last visit? \" No    2. \"Have you seen or consulted any other health care providers outside of the [address] [place] [address] since your last visit? \" No     3. For patients aged 39-70: Has the patient had a colonoscopy / FIT/ Cologuard? Yes - no Care Gap present      If the patient is female:    4. For patients aged 41-77: Has the patient had a mammogram within the past 2 years? NA - based on age or sex      11. For patients aged 21-65: Has the patient had a pap smear?  NA - based on age or sex

## 2022-09-14 LAB
ALBUMIN SERPL-MCNC: 4.4 G/DL (ref 3.8–4.9)
ALBUMIN/GLOB SERPL: 2.2 {RATIO} (ref 1.2–2.2)
ALP SERPL-CCNC: 60 IU/L (ref 44–121)
ALT SERPL-CCNC: 21 IU/L (ref 0–44)
AST SERPL-CCNC: 17 IU/L (ref 0–40)
BASOPHILS # BLD AUTO: 0 X10E3/UL (ref 0–0.2)
BASOPHILS NFR BLD AUTO: 0 %
BILIRUB SERPL-MCNC: 0.3 MG/DL (ref 0–1.2)
BUN SERPL-MCNC: 18 MG/DL (ref 6–24)
BUN/CREAT SERPL: 18 (ref 9–20)
CALCIUM SERPL-MCNC: 9.1 MG/DL (ref 8.7–10.2)
CHLORIDE SERPL-SCNC: 100 MMOL/L (ref 96–106)
CHOLEST SERPL-MCNC: 183 MG/DL (ref 100–199)
CO2 SERPL-SCNC: 29 MMOL/L (ref 20–29)
CREAT SERPL-MCNC: 1 MG/DL (ref 0.76–1.27)
EGFR: 87 ML/MIN/1.73
EOSINOPHIL # BLD AUTO: 0.2 X10E3/UL (ref 0–0.4)
EOSINOPHIL NFR BLD AUTO: 4 %
ERYTHROCYTE [DISTWIDTH] IN BLOOD BY AUTOMATED COUNT: 12.7 % (ref 11.6–15.4)
GLOBULIN SER CALC-MCNC: 2 G/DL (ref 1.5–4.5)
GLUCOSE SERPL-MCNC: 111 MG/DL (ref 65–99)
HCT VFR BLD AUTO: 46.4 % (ref 37.5–51)
HCV AB S/CO SERPL IA: <0.1 S/CO RATIO (ref 0–0.9)
HDLC SERPL-MCNC: 69 MG/DL
HGB BLD-MCNC: 14.7 G/DL (ref 13–17.7)
IMM GRANULOCYTES # BLD AUTO: 0 X10E3/UL (ref 0–0.1)
IMM GRANULOCYTES NFR BLD AUTO: 0 %
IMP & REVIEW OF LAB RESULTS: NORMAL
LDLC SERPL CALC-MCNC: 105 MG/DL (ref 0–99)
LYMPHOCYTES # BLD AUTO: 0.8 X10E3/UL (ref 0.7–3.1)
LYMPHOCYTES NFR BLD AUTO: 19 %
MCH RBC QN AUTO: 28.3 PG (ref 26.6–33)
MCHC RBC AUTO-ENTMCNC: 31.7 G/DL (ref 31.5–35.7)
MCV RBC AUTO: 89 FL (ref 79–97)
MONOCYTES # BLD AUTO: 0.4 X10E3/UL (ref 0.1–0.9)
MONOCYTES NFR BLD AUTO: 9 %
MORPHOLOGY BLD-IMP: NORMAL
NEUTROPHILS # BLD AUTO: 3 X10E3/UL (ref 1.4–7)
NEUTROPHILS NFR BLD AUTO: 68 %
PLATELET # BLD AUTO: 225 X10E3/UL (ref 150–450)
POTASSIUM SERPL-SCNC: 4.9 MMOL/L (ref 3.5–5.2)
PROT SERPL-MCNC: 6.4 G/DL (ref 6–8.5)
RBC # BLD AUTO: 5.19 X10E6/UL (ref 4.14–5.8)
SODIUM SERPL-SCNC: 141 MMOL/L (ref 134–144)
TRIGL SERPL-MCNC: 47 MG/DL (ref 0–149)
VLDLC SERPL CALC-MCNC: 9 MG/DL (ref 5–40)
WBC # BLD AUTO: 4.5 X10E3/UL (ref 3.4–10.8)

## 2022-09-17 NOTE — PROGRESS NOTES
Call pt, the CBC is unremarkable. Electrolytes,kidney and liver tests are ok, but glucose is slightly elevated. The Chol/trig lipids are wnl except the \"LDL\" bad chol is just a little out of range. It is improved from 145 on previous lab last year now to 105. Hep C test is neg.   Cont current meds, work on diet low in trans/sat fats, avoid concentrated sweets and also limit carbs/starchy foods (should eat more veggies, fruit, lean meats) try to get regular exercise and recheck in 6 mo

## 2022-09-19 NOTE — PROGRESS NOTES
Spoke with patient after verifying Name, and , informed patient of lab results and recommendations per Mundo Kate PA-C . Patient given an opportunity to ask questions, repeated information, and verbalized understanding.

## 2023-01-30 DIAGNOSIS — E78.00 HYPERCHOLESTEREMIA: ICD-10-CM

## 2023-01-31 RX ORDER — ATORVASTATIN CALCIUM 10 MG/1
10 TABLET, FILM COATED ORAL
Qty: 90 TABLET | Refills: 0 | Status: SHIPPED | OUTPATIENT
Start: 2023-01-31

## 2023-02-03 NOTE — TELEPHONE ENCOUNTER
Requested Prescriptions     Pending Prescriptions Disp Refills    levothyroxine (SYNTHROID) 50 mcg tablet       Sig: Take 1 Tablet by mouth Daily (before breakfast).  Indications: a condition with low thyroid hormone levels

## 2023-02-06 RX ORDER — LEVOTHYROXINE SODIUM 50 UG/1
50 TABLET ORAL
Qty: 30 TABLET | Refills: 0 | Status: SHIPPED | OUTPATIENT
Start: 2023-02-06

## 2023-03-05 DIAGNOSIS — E78.00 HYPERCHOLESTEREMIA: ICD-10-CM

## 2023-03-05 RX ORDER — ATORVASTATIN CALCIUM 10 MG/1
10 TABLET, FILM COATED ORAL
Qty: 90 TABLET | Refills: 0 | Status: SHIPPED | OUTPATIENT
Start: 2023-03-05

## 2023-03-08 RX ORDER — LEVOTHYROXINE SODIUM 50 UG/1
50 TABLET ORAL
Qty: 15 TABLET | Refills: 0 | Status: SHIPPED | OUTPATIENT
Start: 2023-03-08

## 2023-05-18 RX ORDER — LEVOTHYROXINE SODIUM 0.05 MG/1
50 TABLET ORAL
COMMUNITY
Start: 2023-03-08

## 2023-05-18 RX ORDER — FUROSEMIDE 40 MG/1
40 TABLET ORAL DAILY
COMMUNITY
Start: 2022-07-28

## 2023-05-18 RX ORDER — HYDRALAZINE HYDROCHLORIDE 50 MG/1
1 TABLET, FILM COATED ORAL 2 TIMES DAILY
COMMUNITY
Start: 2022-03-03

## 2023-05-18 RX ORDER — CLONIDINE HYDROCHLORIDE 0.3 MG/1
0.3 TABLET ORAL 2 TIMES DAILY
COMMUNITY
Start: 2022-06-29

## 2023-05-18 RX ORDER — IPRATROPIUM BROMIDE AND ALBUTEROL SULFATE 2.5; .5 MG/3ML; MG/3ML
SOLUTION RESPIRATORY (INHALATION)
COMMUNITY
Start: 2022-07-25

## 2023-05-18 RX ORDER — ATORVASTATIN CALCIUM 10 MG/1
1 TABLET, FILM COATED ORAL NIGHTLY
COMMUNITY
Start: 2023-03-05

## 2023-05-18 RX ORDER — SPIRONOLACTONE 25 MG/1
TABLET ORAL
COMMUNITY
Start: 2022-07-09

## 2023-05-18 RX ORDER — CARVEDILOL 25 MG/1
25 TABLET ORAL 2 TIMES DAILY
COMMUNITY

## 2023-05-18 RX ORDER — ALBUTEROL SULFATE 90 UG/1
2 AEROSOL, METERED RESPIRATORY (INHALATION) EVERY 6 HOURS PRN
COMMUNITY
Start: 2022-07-15

## 2023-05-18 RX ORDER — VALSARTAN 320 MG/1
320 TABLET ORAL DAILY
COMMUNITY
Start: 2022-06-03

## (undated) DEVICE — TOWEL 4 PLY TISS 19X30 SUE WHT

## (undated) DEVICE — BASIN EMSIS 16OZ GRAPHITE PLAS KID SHP MOLD GRAD FOR ORAL

## (undated) DEVICE — SYR 10ML LUER LOK 1/5ML GRAD --

## (undated) DEVICE — SYR 3ML LL TIP 1/10ML GRAD --

## (undated) DEVICE — NEONATAL-ADULT SPO2 SENSOR: Brand: NELLCOR

## (undated) DEVICE — Device

## (undated) DEVICE — SET ADMIN 16ML TBNG L100IN 2 Y INJ SITE IV PIGGY BK DISP

## (undated) DEVICE — SOLIDIFIER MEDC 1200ML -- CONVERT TO 356117

## (undated) DEVICE — Z DISCONTINUED PER MEDLINE LINE GAS SAMPLING O2/CO2 LNG AD 13 FT NSL W/ TBNG FILTERLINE

## (undated) DEVICE — CATH IV AUTOGRD BC PNK 20GA 25 -- INSYTE

## (undated) DEVICE — NEEDLE HYPO 18GA L1.5IN PNK S STL HUB POLYPR SHLD REG BVL

## (undated) DEVICE — ELECTRODE,RADIOTRANSLUCENT,FOAM,5PK: Brand: MEDLINE

## (undated) DEVICE — 1200 GUARD II KIT W/5MM TUBE W/O VAC TUBE: Brand: GUARDIAN